# Patient Record
Sex: FEMALE | Race: WHITE | NOT HISPANIC OR LATINO | Employment: OTHER | URBAN - METROPOLITAN AREA
[De-identification: names, ages, dates, MRNs, and addresses within clinical notes are randomized per-mention and may not be internally consistent; named-entity substitution may affect disease eponyms.]

---

## 2017-10-12 ENCOUNTER — OFFICE VISIT (OUTPATIENT)
Dept: FAMILY MEDICINE | Facility: CLINIC | Age: 82
End: 2017-10-12
Payer: MEDICARE

## 2017-10-12 ENCOUNTER — TELEPHONE (OUTPATIENT)
Dept: FAMILY MEDICINE | Facility: CLINIC | Age: 82
End: 2017-10-12

## 2017-10-12 VITALS
HEART RATE: 119 BPM | OXYGEN SATURATION: 97 % | SYSTOLIC BLOOD PRESSURE: 125 MMHG | DIASTOLIC BLOOD PRESSURE: 76 MMHG | WEIGHT: 121.94 LBS | HEIGHT: 63 IN | RESPIRATION RATE: 18 BRPM | BODY MASS INDEX: 21.61 KG/M2 | TEMPERATURE: 99 F

## 2017-10-12 DIAGNOSIS — J11.1 INFLUENZA-LIKE ILLNESS: ICD-10-CM

## 2017-10-12 DIAGNOSIS — I48.91 ATRIAL FIBRILLATION WITH RAPID VENTRICULAR RESPONSE: ICD-10-CM

## 2017-10-12 DIAGNOSIS — Z20.828 EXPOSURE TO THE FLU: Primary | ICD-10-CM

## 2017-10-12 DIAGNOSIS — J10.1 INFLUENZA A: ICD-10-CM

## 2017-10-12 PROCEDURE — 93010 ELECTROCARDIOGRAM REPORT: CPT | Mod: ,,, | Performed by: INTERNAL MEDICINE

## 2017-10-12 PROCEDURE — 99203 OFFICE O/P NEW LOW 30 MIN: CPT | Mod: S$PBB,,, | Performed by: FAMILY MEDICINE

## 2017-10-12 PROCEDURE — 93005 ELECTROCARDIOGRAM TRACING: CPT | Mod: PBBFAC,PO | Performed by: FAMILY MEDICINE

## 2017-10-12 PROCEDURE — 99999 PR PBB SHADOW E&M-NEW PATIENT-LVL III: CPT | Mod: PBBFAC,,, | Performed by: FAMILY MEDICINE

## 2017-10-12 PROCEDURE — 99203 OFFICE O/P NEW LOW 30 MIN: CPT | Mod: PBBFAC,PO | Performed by: FAMILY MEDICINE

## 2017-10-12 RX ORDER — FELODIPINE 5 MG/1
5 TABLET, EXTENDED RELEASE ORAL 2 TIMES DAILY
COMMUNITY
End: 2021-02-03 | Stop reason: SDUPTHER

## 2017-10-12 RX ORDER — LOSARTAN POTASSIUM 100 MG/1
100 TABLET ORAL DAILY
COMMUNITY
End: 2020-11-12

## 2017-10-12 RX ORDER — ASPIRIN 325 MG
325 TABLET ORAL DAILY
COMMUNITY
End: 2019-01-31

## 2017-10-12 RX ORDER — AMOXICILLIN 500 MG
2 CAPSULE ORAL DAILY
COMMUNITY
End: 2019-01-31

## 2017-10-12 NOTE — PROGRESS NOTES
"Subjective:       Patient ID: Rosalva Toney is a 89 y.o. female.    Chief Complaint: URI (head congestion and fatigue)    Pt's daughter tested positive for Flu A today.  They have been together for 4 days.  Pt had a full cardio workup in [place] in past 4-6 months with a cath and all was "normal."      URI    This is a new problem. The current episode started yesterday. There has been no fever. Associated symptoms include coughing and a sore throat. Pertinent negatives include no abdominal pain, chest pain, nausea or rash. Associated symptoms comments: Fatigue.. She has tried NSAIDs for the symptoms. The treatment provided mild relief.     Review of Systems   Constitutional: Negative for fever.   HENT: Positive for sore throat.    Respiratory: Positive for cough. Negative for shortness of breath.    Cardiovascular: Negative for chest pain and palpitations.   Gastrointestinal: Negative for abdominal pain and nausea.   Skin: Negative for rash.   All other systems reviewed and are negative.      Objective:      Physical Exam   Constitutional: She appears well-developed. No distress.   HENT:   Right Ear: Tympanic membrane is not erythematous.   Left Ear: Tympanic membrane is not erythematous.   Nose: Mucosal edema present. Right sinus exhibits no maxillary sinus tenderness. Left sinus exhibits no maxillary sinus tenderness.   Mouth/Throat: Posterior oropharyngeal erythema present.   Neck: Neck supple.   Cardiovascular: An irregularly irregular rhythm present. Tachycardia present.  Exam reveals no gallop.    No murmur heard.  Pulmonary/Chest: Effort normal and breath sounds normal.   Lymphadenopathy:     She has no cervical adenopathy.       Assessment:       1. Exposure to the flu        Plan:         Rosalva was seen today for uri.    Diagnoses and all orders for this visit:    Exposure to the flu  -     POCT Influenza A/B    Atrial fibrillation with rapid ventricular response    Influenza-like illness     To [facility] ER " now for eval and Rx of A Fib

## 2017-10-12 NOTE — TELEPHONE ENCOUNTER
----- Message from Sam Brooks sent at 10/12/2017  2:19 PM CDT -----  Contact: Patient's daughter, Joseline Rodas  Patient's daughter is calling to see if Dr. Rosales will see patient for possible flu.(patient is not an established patient with [facility] at this time)  Call Back #[phone]  Thanks

## 2017-10-13 RX ORDER — OSELTAMIVIR PHOSPHATE 75 MG/1
75 CAPSULE ORAL 2 TIMES DAILY
Qty: 10 CAPSULE | Refills: 0 | Status: SHIPPED | OUTPATIENT
Start: 2017-10-13 | End: 2017-10-18

## 2017-10-16 ENCOUNTER — TELEPHONE (OUTPATIENT)
Dept: FAMILY MEDICINE | Facility: CLINIC | Age: 82
End: 2017-10-16

## 2017-10-16 DIAGNOSIS — Z76.89 ENCOUNTER TO ESTABLISH CARE: Primary | ICD-10-CM

## 2018-03-23 ENCOUNTER — OFFICE VISIT (OUTPATIENT)
Dept: OPTOMETRY | Facility: CLINIC | Age: 83
End: 2018-03-23
Payer: MEDICARE

## 2018-03-23 DIAGNOSIS — Z96.1 PSEUDOPHAKIA: ICD-10-CM

## 2018-03-23 DIAGNOSIS — H35.30 ARMD (AGE RELATED MACULAR DEGENERATION): Primary | ICD-10-CM

## 2018-03-23 DIAGNOSIS — H52.7 REFRACTIVE ERROR: ICD-10-CM

## 2018-03-23 DIAGNOSIS — M35.01 KCS (KERATOCONJUNCTIVITIS SICCA): ICD-10-CM

## 2018-03-23 PROCEDURE — 99999 PR PBB SHADOW E&M-EST. PATIENT-LVL II: CPT | Mod: PBBFAC,,, | Performed by: OPTOMETRIST

## 2018-03-23 PROCEDURE — 99212 OFFICE O/P EST SF 10 MIN: CPT | Mod: PBBFAC,PO | Performed by: OPTOMETRIST

## 2018-03-23 PROCEDURE — 92004 COMPRE OPH EXAM NEW PT 1/>: CPT | Mod: S$PBB,,, | Performed by: OPTOMETRIST

## 2018-03-23 RX ORDER — HYDRALAZINE HYDROCHLORIDE 50 MG/1
TABLET, FILM COATED ORAL
COMMUNITY
Start: 2017-12-29 | End: 2019-01-03

## 2018-03-23 RX ORDER — AMLODIPINE BESYLATE 2.5 MG/1
TABLET ORAL
COMMUNITY
Start: 2017-12-21 | End: 2019-01-03

## 2018-03-23 RX ORDER — APIXABAN 2.5 MG/1
TABLET, FILM COATED ORAL
COMMUNITY
Start: 2018-03-20 | End: 2021-01-14 | Stop reason: SDUPTHER

## 2018-03-23 RX ORDER — METOPROLOL TARTRATE 25 MG/1
TABLET, FILM COATED ORAL
COMMUNITY
Start: 2018-03-19 | End: 2019-01-03 | Stop reason: SDUPTHER

## 2018-03-23 NOTE — PROGRESS NOTES
HPI     Presenting Complaint: Pt here to[place] for yearly eye exam. Pt uses glasses   for reading small print.     (+) Dry eyes. Pt staets over the last few months dryness has got worse.     Ophthalmic medication / drops: Art tears as needed for dryness    (+) Hx of ARMD dry    (+) Cataract sx 2014    (+) Viteyes, classic formula  (+) viteyes Areds 2    (-) headaches  (-) diplopia   (-) flashes / (+) hx of floaters      Last edited by Ulisses Almaraz, OD on 3/23/2018  2:43 PM. (History)            Assessment /Plan     For exam results, see Encounter Report.    ARMD (age related macular degeneration)    KCS (keratoconjunctivitis sicca)    Pseudophakia    Refractive error      ARMD OU. Good vision. Continue OTC AREDS vitamins. Pt denies smoking. Amsler grid for home self-monitoring. Sunglasses outdoors. Monitor yearly, sooner if changes.    KCS OU. Discussed treatment options, pt defers Restasis at this time. Recommend preservative free artificial tears four times daily OU. Discussed punctal plug options, pt will schedule as desired. Return if any worsening of symptoms.    S/p cataract extraction with good results. Pt happy with uncorrected distance vision and OTC readers prn for near. Denies refraction. Return prn for updated spec Rx.      RTC in 1 year for comprehensive eye exam, or sooner prn.

## 2019-01-03 ENCOUNTER — OFFICE VISIT (OUTPATIENT)
Dept: ORTHOPEDICS | Facility: CLINIC | Age: 84
End: 2019-01-03
Payer: MEDICARE

## 2019-01-03 VITALS
BODY MASS INDEX: 20.38 KG/M2 | WEIGHT: 115 LBS | SYSTOLIC BLOOD PRESSURE: 138 MMHG | HEART RATE: 59 BPM | DIASTOLIC BLOOD PRESSURE: 70 MMHG | HEIGHT: 63 IN

## 2019-01-03 DIAGNOSIS — S82.851E TYPE I OR II OPEN TRIMALLEOLAR FRACTURE OF RIGHT ANKLE WITH ROUTINE HEALING, SUBSEQUENT ENCOUNTER: Primary | ICD-10-CM

## 2019-01-03 PROCEDURE — 99024 POSTOP FOLLOW-UP VISIT: CPT | Mod: POP,,, | Performed by: ORTHOPAEDIC SURGERY

## 2019-01-03 PROCEDURE — 99024 PR POST-OP FOLLOW-UP VISIT: ICD-10-PCS | Mod: POP,,, | Performed by: ORTHOPAEDIC SURGERY

## 2019-01-03 PROCEDURE — 73610 PR  X-RAY ANKLE 3+ VW: ICD-10-PCS | Mod: RT,,, | Performed by: ORTHOPAEDIC SURGERY

## 2019-01-03 PROCEDURE — 73610 X-RAY EXAM OF ANKLE: CPT | Mod: RT,,, | Performed by: ORTHOPAEDIC SURGERY

## 2019-01-03 RX ORDER — DIGOXIN 125 UG/1
TABLET ORAL
Refills: 0 | COMMUNITY
Start: 2018-12-26 | End: 2020-12-10

## 2019-01-03 RX ORDER — OXYCODONE HYDROCHLORIDE 5 MG/1
TABLET ORAL
Refills: 0 | COMMUNITY
Start: 2018-12-26 | End: 2019-01-31

## 2019-01-03 RX ORDER — METOPROLOL SUCCINATE 25 MG/1
TABLET, EXTENDED RELEASE ORAL
Refills: 0 | COMMUNITY
Start: 2018-10-30 | End: 2020-09-19

## 2019-01-10 ENCOUNTER — OFFICE VISIT (OUTPATIENT)
Dept: ORTHOPEDICS | Facility: CLINIC | Age: 84
End: 2019-01-10
Payer: MEDICARE

## 2019-01-10 VITALS
BODY MASS INDEX: 21.26 KG/M2 | SYSTOLIC BLOOD PRESSURE: 112 MMHG | DIASTOLIC BLOOD PRESSURE: 68 MMHG | WEIGHT: 120 LBS | HEIGHT: 63 IN | HEART RATE: 113 BPM

## 2019-01-10 DIAGNOSIS — S82.851E TYPE I OR II OPEN TRIMALLEOLAR FRACTURE OF RIGHT ANKLE WITH ROUTINE HEALING, SUBSEQUENT ENCOUNTER: Primary | ICD-10-CM

## 2019-01-10 PROCEDURE — 99024 PR POST-OP FOLLOW-UP VISIT: ICD-10-PCS | Mod: POP,,, | Performed by: ORTHOPAEDIC SURGERY

## 2019-01-10 PROCEDURE — 99024 POSTOP FOLLOW-UP VISIT: CPT | Mod: POP,,, | Performed by: ORTHOPAEDIC SURGERY

## 2019-01-10 NOTE — PROGRESS NOTES
[facility] POST-OP NOTE    Subjective:           Chief Complaint:   Chief Complaint   Patient presents with    Right Ankle - Post-op Evaluation       SUTURES 1 week follow up Right Ankle ORIF (12.19.18) She has minor soreness. It makes her left leg hurt       Past Medical History:   Diagnosis Date    Hypertension     MVP (mitral valve prolapse)     Skin cancer        Past Surgical History:   Procedure Laterality Date    ANKLE FRACTURE SURGERY      HYSTERECTOMY      partial mastecomy      twisted bowl         Current Outpatient Medications   Medication Sig    ASCORBATE CALCIUM (VITAMIN C ORAL) Take by mouth.    aspirin 325 MG tablet Take 325 mg by mouth once daily.    CALCIUM ORAL Take by mouth.    CYANOCOBALAMIN, VITAMIN B-12, (VITAMIN B-12 ORAL) Take by mouth.    DIGOX 125 mcg tablet TK 1 T PO QD    ELIQUIS 2.5 mg Tab     felodipine (PLENDIL) 5 MG 24 hr tablet Take 5 mg by mouth 2 (two) times daily.    fish oil-omega-3 fatty acids 300-1,000 mg capsule Take 2 g by mouth once daily.    LECITHIN ORAL Take by mouth.    losartan (COZAAR) 100 MG tablet Take 100 mg by mouth once daily.    metoprolol succinate (TOPROL-XL) 25 MG 24 hr tablet TK 1 T PO QD    oxyCODONE (ROXICODONE) 5 MG immediate release tablet TK 1 T PO Q 6 H PRN P    UBIDECARENONE (CO Q-10 ORAL) Take by mouth.    VIT C/VIT E/LUTEIN/MIN/OMEGA-3 (OCUVITE ORAL) Take by mouth.    WHEAT GERM OIL ORAL Take by mouth.     No current facility-administered medications for this visit.        Review of patient's allergies indicates:  No Known Allergies    Family History   Problem Relation Age of Onset    Glaucoma Neg Hx     Macular degeneration Neg Hx     Retinal detachment Neg Hx        Social History     Socioeconomic History    Marital status:      Spouse name: Not on file    Number of children: Not on file    Years of education: Not on file    Highest education level: Not on file   Social Needs    Financial resource  strain: Not on file    Food insecurity - worry: Not on file    Food insecurity - inability: Not on file    Transportation needs - medical: Not on file    Transportation needs - non-medical: Not on file   Occupational History    Not on file   Tobacco Use    Smoking status: Never Smoker    Smokeless tobacco: Never Used   Substance and Sexual Activity    Alcohol use: Yes     Comment: 1 [place] daily    Drug use: No    Sexual activity: Not on file   Other Topics Concern    Not on file   Social History Narrative    Not on file       History of present illness: Patient returns status post ORIF of bimalleolar ankle fracture open      Review of Systems:    Musculoskeletal:  See HPI      Objective:        Physical Examination:    Vital Signs:    Vitals:    01/10/19 1246   BP: 112/68   Pulse: (!) 113       Body mass index is 21.26 kg/m².    This a well-developed, well nourished patient in no acute distress.  They are alert and oriented and cooperative to examination.        Wounds are clean dry and intact.  Pertinent New Results:    XRAY Report / Interpretation:   AP lateral oblique of the right ankle demonstrate her fracture be in good position. Mortise is intact. There is about 5 mm of displacement of the medial malleolus    Assessment/Plan:      Severe open fracture right ankle. Continue walker boot. No weightbearing. Follow-up in 3 weeks    This note was created using Dragon voice recognition software that occasionally misinterpreted phrases or words.

## 2019-01-24 DIAGNOSIS — S82.851E: Primary | ICD-10-CM

## 2019-01-24 RX ORDER — HYDROCODONE BITARTRATE AND ACETAMINOPHEN 7.5; 325 MG/1; MG/1
1 TABLET ORAL EVERY 6 HOURS PRN
Qty: 28 TABLET | Refills: 0 | Status: SHIPPED | OUTPATIENT
Start: 2019-01-24 | End: 2019-01-31

## 2019-01-31 ENCOUNTER — OFFICE VISIT (OUTPATIENT)
Dept: ORTHOPEDICS | Facility: CLINIC | Age: 84
End: 2019-01-31
Payer: MEDICARE

## 2019-01-31 VITALS
HEIGHT: 63 IN | HEART RATE: 70 BPM | SYSTOLIC BLOOD PRESSURE: 100 MMHG | BODY MASS INDEX: 21.26 KG/M2 | DIASTOLIC BLOOD PRESSURE: 60 MMHG | WEIGHT: 120 LBS

## 2019-01-31 DIAGNOSIS — S82.851E TYPE I OR II OPEN TRIMALLEOLAR FRACTURE OF RIGHT ANKLE WITH ROUTINE HEALING, SUBSEQUENT ENCOUNTER: Primary | ICD-10-CM

## 2019-01-31 PROCEDURE — 99024 POSTOP FOLLOW-UP VISIT: CPT | Mod: POP,,, | Performed by: ORTHOPAEDIC SURGERY

## 2019-01-31 PROCEDURE — 73610 PR  X-RAY ANKLE 3+ VW: ICD-10-PCS | Mod: RT,,, | Performed by: ORTHOPAEDIC SURGERY

## 2019-01-31 PROCEDURE — 73610 X-RAY EXAM OF ANKLE: CPT | Mod: RT,,, | Performed by: ORTHOPAEDIC SURGERY

## 2019-01-31 PROCEDURE — 99024 PR POST-OP FOLLOW-UP VISIT: ICD-10-PCS | Mod: POP,,, | Performed by: ORTHOPAEDIC SURGERY

## 2019-01-31 NOTE — PROGRESS NOTES
[facility]    Subjective:     Chief Complaint:   Chief Complaint   Patient presents with    Right Ankle - Injury     3 wk follow up from 01/10/19 for Xray ORIF rt ankle fx. No pain. No problems       Past Medical History:   Diagnosis Date    Hypertension     MVP (mitral valve prolapse)     Skin cancer        Past Surgical History:   Procedure Laterality Date    ANKLE FRACTURE SURGERY      HYSTERECTOMY      partial mastecomy      twisted bowl         Current Outpatient Medications   Medication Sig    ASCORBATE CALCIUM (VITAMIN C ORAL) Take by mouth.    CALCIUM ORAL Take by mouth.    DIGOX 125 mcg tablet TK 1 T PO QD    ELIQUIS 2.5 mg Tab     felodipine (PLENDIL) 5 MG 24 hr tablet Take 5 mg by mouth 2 (two) times daily.    LECITHIN ORAL Take by mouth.    losartan (COZAAR) 100 MG tablet Take 100 mg by mouth once daily.    metoprolol succinate (TOPROL-XL) 25 MG 24 hr tablet TK 1 T PO QD    UBIDECARENONE (CO Q-10 ORAL) Take by mouth.    VIT C/VIT E/LUTEIN/MIN/OMEGA-3 (OCUVITE ORAL) Take by mouth.    WHEAT GERM OIL ORAL Take by mouth.    HYDROcodone-acetaminophen (NORCO) 7.5-325 mg per tablet Take 1 tablet by mouth every 6 (six) hours as needed for Pain.     No current facility-administered medications for this visit.        Review of patient's allergies indicates:  No Known Allergies    Family History   Problem Relation Age of Onset    Glaucoma Neg Hx     Macular degeneration Neg Hx     Retinal detachment Neg Hx        Social History     Socioeconomic History    Marital status:      Spouse name: Not on file    Number of children: Not on file    Years of education: Not on file    Highest education level: Not on file   Social Needs    Financial resource strain: Not on file    Food insecurity - worry: Not on file    Food insecurity - inability: Not on file    Transportation needs - medical: Not on file    Transportation needs - non-medical: Not on file   Occupational  History    Not on file   Tobacco Use    Smoking status: Never Smoker    Smokeless tobacco: Never Used   Substance and Sexual Activity    Alcohol use: Yes     Comment: 1 [place] daily    Drug use: No    Sexual activity: Not on file   Other Topics Concern    Not on file   Social History Narrative    Not on file       History of present illness: ***      Review of Systems:    Constitution: Negative for chills, fever, and sweats.  Negative for unexplained weight loss.    HENT:  Negative for headaches and blurry vision.    Cardiovascular:Negative for chest pain or irregular heart beat. Negative for hypertension.    Respiratory:  Negative for cough and shortness of breath.    Gastrointestinal: Negative for abdominal pain, heartburn, melena, nausea, and vomitting.    Genitourinary:  Negative bladder incontinence and dysuria.    Musculoskeletal:  See HPI for details.     Neurological: Negative for numbness.    Psychiatric/Behavioral: Negative for depression.  The patient is not nervous/anxious.      Endocrine: Negative for polyuria    Hematologic/Lymphatic: Negative for bleeding problem.  Does not bruise/bleed easily.    Skin: Negative for poor would healing and rash    Objective:      Physical Examination:    Vital Signs:    Vitals:    01/31/19 1247   BP: 100/60   Pulse: 70       Body mass index is 21.26 kg/m².    This a well-developed, well nourished patient in no acute distress.  They are alert and oriented and cooperative to examination.        ***  Pertinent New Results:    XRAY Report / Interpretation:   ***    Assessment/Plan:      ***      This note was created using Dragon voice recognition software that occasionally misinterpreted phrases or words.

## 2019-01-31 NOTE — PROGRESS NOTES
[facility] ORTHOPEDICS POST-OP NOTE    Subjective:           Chief Complaint:   Chief Complaint   Patient presents with    Right Ankle - Injury     3 wk follow up from 01/10/19 for Xray ORIF rt ankle fx. No pain. No problems       Past Medical History:   Diagnosis Date    Hypertension     MVP (mitral valve prolapse)     Skin cancer        Past Surgical History:   Procedure Laterality Date    ANKLE FRACTURE SURGERY      HYSTERECTOMY      partial mastecomy      twisted bowl         Current Outpatient Medications   Medication Sig    ASCORBATE CALCIUM (VITAMIN C ORAL) Take by mouth.    CALCIUM ORAL Take by mouth.    DIGOX 125 mcg tablet TK 1 T PO QD    ELIQUIS 2.5 mg Tab     felodipine (PLENDIL) 5 MG 24 hr tablet Take 5 mg by mouth 2 (two) times daily.    LECITHIN ORAL Take by mouth.    losartan (COZAAR) 100 MG tablet Take 100 mg by mouth once daily.    metoprolol succinate (TOPROL-XL) 25 MG 24 hr tablet TK 1 T PO QD    UBIDECARENONE (CO Q-10 ORAL) Take by mouth.    VIT C/VIT E/LUTEIN/MIN/OMEGA-3 (OCUVITE ORAL) Take by mouth.    WHEAT GERM OIL ORAL Take by mouth.    HYDROcodone-acetaminophen (NORCO) 7.5-325 mg per tablet Take 1 tablet by mouth every 6 (six) hours as needed for Pain.     No current facility-administered medications for this visit.        Review of patient's allergies indicates:  No Known Allergies    Family History   Problem Relation Age of Onset    Glaucoma Neg Hx     Macular degeneration Neg Hx     Retinal detachment Neg Hx        Social History     Socioeconomic History    Marital status:      Spouse name: Not on file    Number of children: Not on file    Years of education: Not on file    Highest education level: Not on file   Social Needs    Financial resource strain: Not on file    Food insecurity - worry: Not on file    Food insecurity - inability: Not on file    Transportation needs - medical: Not on file    Transportation needs - non-medical: Not on file    Occupational History    Not on file   Tobacco Use    Smoking status: Never Smoker    Smokeless tobacco: Never Used   Substance and Sexual Activity    Alcohol use: Yes     Comment: 1 [place] daily    Drug use: No    Sexual activity: Not on file   Other Topics Concern    Not on file   Social History Narrative    Not on file       History of present illness: Patient comes in today for her right open fracture. She is doing remarkably well. She is 6 weeks out      Review of Systems:    Musculoskeletal:  See HPI      Objective:        Physical Examination:    Vital Signs:    Vitals:    01/31/19 1247   BP: 100/60   Pulse: 70       Body mass index is 21.26 kg/m².    This a well-developed, well nourished patient in no acute distress.  They are alert and oriented and cooperative to examination.        Wounds are clean dry and intact. Neurovascularly intact the foot  Pertinent New Results:    XRAY Report / Interpretation:   AP lateral and oblique of the right ankle demonstrates an intact mortise. The fracture appears to be of unionized. The hardware is in good position. No change in position of the hardware with a fracture    Assessment/Plan:      Stable following ORIF of an open ankle fracture. Continue therapy. Weightbearing as tolerated in the boot. Wean out of the boot in 3 weeks. Follow-up in 6 weeks    This note was created using Dragon voice recognition software that occasionally misinterpreted phrases or words.

## 2019-02-28 ENCOUNTER — OFFICE VISIT (OUTPATIENT)
Dept: FAMILY MEDICINE | Facility: CLINIC | Age: 84
End: 2019-02-28
Payer: MEDICARE

## 2019-02-28 ENCOUNTER — HOSPITAL ENCOUNTER (OUTPATIENT)
Dept: RADIOLOGY | Facility: CLINIC | Age: 84
Discharge: HOME | End: 2019-02-28
Attending: NURSE PRACTITIONER
Payer: MEDICARE

## 2019-02-28 VITALS
DIASTOLIC BLOOD PRESSURE: 71 MMHG | HEIGHT: 63 IN | OXYGEN SATURATION: 98 % | SYSTOLIC BLOOD PRESSURE: 148 MMHG | BODY MASS INDEX: 21.26 KG/M2 | TEMPERATURE: 98 F | HEART RATE: 66 BPM

## 2019-02-28 DIAGNOSIS — I34.1 MVP (MITRAL VALVE PROLAPSE): ICD-10-CM

## 2019-02-28 DIAGNOSIS — M54.50 LOW BACK PAIN, NON-SPECIFIC: ICD-10-CM
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
## 2019-02-28 DIAGNOSIS — I10 ESSENTIAL HYPERTENSION: ICD-10-CM

## 2019-02-28 DIAGNOSIS — Z98.890 S/P ORIF (OPEN REDUCTION INTERNAL FIXATION) FRACTURE: ICD-10-CM

## 2019-02-28 DIAGNOSIS — Z87.81 S/P ORIF (OPEN REDUCTION INTERNAL FIXATION) FRACTURE: ICD-10-CM

## 2019-02-28 PROCEDURE — 99214 OFFICE O/P EST MOD 30 MIN: CPT | Mod: S$PBB,,, | Performed by: NURSE PRACTITIONER

## 2019-02-28 PROCEDURE — 99999 PR PBB SHADOW E&M-EST. PATIENT-LVL IV: CPT | Mod: PBBFAC,,, | Performed by: NURSE PRACTITIONER

## 2019-02-28 PROCEDURE — 72110 XR LUMBAR SPINE COMPLETE 5 VIEW: ICD-10-PCS | Mod: 26,,, | Performed by: RADIOLOGY

## 2019-02-28 PROCEDURE — 99214 OFFICE O/P EST MOD 30 MIN: CPT | Mod: PBBFAC,25,PO | Performed by: NURSE PRACTITIONER

## 2019-02-28 PROCEDURE — 72110 X-RAY EXAM L-2 SPINE 4/>VWS: CPT | Mod: 26,,, | Performed by: RADIOLOGY

## 2019-02-28 PROCEDURE — 72110 X-RAY EXAM L-2 SPINE 4/>VWS: CPT | Mod: TC,FY,PO

## 2019-02-28 PROCEDURE — 99214 PR OFFICE/OUTPT VISIT, EST, LEVL IV, 30-39 MIN: ICD-10-PCS | Mod: S$PBB,,, | Performed by: NURSE PRACTITIONER

## 2019-02-28 PROCEDURE — 99999 PR PBB SHADOW E&M-EST. PATIENT-LVL IV: ICD-10-PCS | Mod: PBBFAC,,, | Performed by: NURSE PRACTITIONER

## 2019-02-28 NOTE — PATIENT INSTRUCTIONS
General Neck and Back Pain    Both neck and back pain are usually caused by injury to the muscles or ligaments of the spine. Sometimes the disks that separate each bone of the spine may cause pain by pressing on a nearby nerve. Back and neck pain may appear after a sudden twisting or bending force (such as in a car accident), or sometimes after a simple awkward movement. In either case, muscle spasm is often present and adds to the pain.  Acute neck and back pain usually gets better in 1 to 2 weeks. Pain related to disk disease, arthritis in the spinal joints or spinal stenosis (narrowing of the spinal canal) can become chronic and last for months or years.  Back and neck pain are common problems. Most people feel better in 1 or 2 weeks, and most of the rest in 1 to 2 months. Most people can remain active.  People experience and describe pain differently.  · Pain can be sharp, stabbing, shooting, aching, cramping, or burning  · Movement, standing, bending, lifting, sitting, or walking may worsen the pain  · Pain can be localized to one spot or area, or it can be more generalized  · Pain can spread or radiate upwards, downwards, to the front, or go down your arms  · Muscle spasm may occur.  Most of the time mechanical problems with the muscles or spine cause the pain. it is usually caused by an injury, whether known or not, to the muscles or ligaments. While illnesses can cause back pain, it is usually not caused by a serious illness. Pain is usually related to physical activity, whether sports, exercise, work, or normal activity. Sometimes it can occur without an identifiable cause. This can happen simply by stretching or moving wrong, without noting pain at the time. Other causes include:  · Overexertion, lifting, pushing, pulling incorrectly or too aggressively.  · Sudden twisting, bending or stretching from an accident (car or fall), or accidental movement.  · Poor posture  · Poor conditioning, lack of regular  exercise  · Spinal disc disease or arthritis  · Stress  · Pregnancy, or illness like appendicitis, bladder or kidney infection, pelvic infections   Home care  · For neck pain: Use a comfortable pillow that supports the head and keeps the spine in a neutral position. The position of the head should not be tilted forward or backward.  · When in bed, try to find a position of comfort. A firm mattress is best. Try lying flat on your back with pillows under your knees. You can also try lying on your side with your knees bent up towards your chest and a pillow between your knees.  · At first, do not try to stretch out the sore spots. If there is a strain, it is not like the good soreness you get after exercising without an injury. In this case, stretching may make it worse.  · Avoid prolonged sitting, long car rides or travel. This puts more stress on the lower back than standing or walking.  · During the first 24 to 72 hours after an injury, apply an ice pack to the painful area for 20 minutes and then remove it for 20 minutes over a period of 60 to 90 minutes or several times a day.   · You can alternate ice and heat therapies. Talk with your healthcare provider about the best treatment for your back or neck pain. As a safety precaution, do not use a heating pad at bedtime. Sleeping with a heating pad can lead to skin burns or tissue damage.  · Therapeutic massage can help relax the back and neck muscles without stretching them.  · Be aware of safe lifting methods and do not lift anything over 15 pounds until all the pain is gone.  Medications  Talk to your healthcare provider before using medicine, especially if you have other medical problems or are taking other medicines.  · You may use over-the-counter medicine to control pain, unless another pain medicine was prescribed. If you have chronic conditions like diabetes, liver or kidney disease, stomach ulcers,  gastrointestinal bleeding, or are taking blood thinner  medicines.  · Be careful if you are given pain medicines, narcotics, or medicine for muscle spasm. They can cause drowsiness, and can affect your coordination, reflexes, and judgment. Do not drive or operate heavy machinery.  Follow-up care  Follow up with your healthcare provider, or as advised. Physical therapy or further tests may be needed.  If X-rays were taken, you will be notified of any new findings that may affect your care.  Call 911  Seek emergency medical care if any of the following occur:  · Trouble breathing  · Confusion  · Very drowsy or trouble awakening  · Fainting or loss of consciousness  · Rapid or very slow heart rate  · Loss of bowel or bladder control  When to seek medical advice  Call your healthcare provider right away if any of these occur:  · Pain becomes worse or spreads into your arms or legs  · Weakness, numbness or pain in one or both arms or legs  · Numbness in the groin area  · Difficulty walking  · Fever of 100.4ºF (38ºC) or higher, or as directed by your healthcare provider  Date Last Reviewed: 7/1/2016 © 2000-2017 [employer]. [address] [place] [address], [place] [address]. All rights reserved. This information is not intended as a substitute for professional medical care. Always follow your healthcare professional's instructions.

## 2019-02-28 NOTE — PROGRESS NOTES
Subjective:       Patient ID: Rosalva Toney is a 89 y.o. female.    Chief Complaint: Fall    Patient presents today with daughter for a fall on 2/27/19. Patient presents in a wheelchair with a right leg immobilizer form a Right Ankle ORIF (12.19.18). Patient is a resident of [facility]. She verbalizes turning while standing without her right leg immobilizer and falling onto the carpet hitting her head and back on the cabinet. She reports no lost of conscious or confusion.. She is followed by Physical Therapy 3 times a week.        Fall   The accident occurred 12 to 24 hours ago. The fall occurred while standing. She fell from a height of 1 to 2 ft. She landed on carpet. There was no blood loss. The point of impact was the head and right elbow. The pain is present in the back. The pain is at a severity of 10/10. The symptoms are aggravated by movement. Pertinent negatives include no fever, loss of consciousness, nausea, numbness or tingling. She has tried nothing for the symptoms.   Back Pain   This is a new problem. The current episode started yesterday. The problem occurs daily. The pain is present in the lumbar spine. The quality of the pain is described as stabbing and aching. The pain does not radiate. The pain is at a severity of 10/10. The symptoms are aggravated by bending. Pertinent negatives include no chest pain, fever, leg pain, numbness or tingling. Risk factors include history of osteoporosis. She has tried analgesics for the symptoms. The treatment provided mild relief.       Past Medical History:   Diagnosis Date    Hypertension     MVP (mitral valve prolapse)     Skin cancer        Review of patient's allergies indicates:  No Known Allergies      Current Outpatient Medications:     ASCORBATE CALCIUM (VITAMIN C ORAL), Take by mouth., Disp: , Rfl:     CALCIUM ORAL, Take by mouth., Disp: , Rfl:     DIGOX 125 mcg tablet, TK 1 T PO QD, Disp: , Rfl: 0    ELIQUIS 2.5 mg Tab, ,  "Disp: , Rfl:     felodipine (PLENDIL) 5 MG 24 hr tablet, Take 5 mg by mouth 2 (two) times daily., Disp: , Rfl:     LECITHIN ORAL, Take by mouth., Disp: , Rfl:     losartan (COZAAR) 100 MG tablet, Take 100 mg by mouth once daily., Disp: , Rfl:     metoprolol succinate (TOPROL-XL) 25 MG 24 hr tablet, TK 1 T PO QD, Disp: , Rfl: 0    UBIDECARENONE (CO Q-10 ORAL), Take by mouth., Disp: , Rfl:     VIT C/VIT E/LUTEIN/MIN/OMEGA-3 (OCUVITE ORAL), Take by mouth., Disp: , Rfl:     WHEAT GERM OIL ORAL, Take by mouth., Disp: , Rfl:     meloxicam (MOBIC) 7.5 MG tablet, Take 1 tablet (7.5 mg total) by mouth once daily. for 7 days, Disp: 7 tablet, Rfl: 0    Review of Systems   Constitutional: Positive for activity change. Negative for fatigue and fever.   HENT: Negative for congestion, rhinorrhea and sore throat.    Respiratory: Negative for shortness of breath.    Cardiovascular: Negative for chest pain and palpitations.   Gastrointestinal: Negative for constipation, diarrhea and nausea.   Genitourinary: Negative for frequency and urgency.   Musculoskeletal: Positive for arthralgias (right ankle) and back pain.   Skin: Positive for wound. Negative for rash.   Neurological: Negative for dizziness, tingling, loss of consciousness, light-headedness and numbness.   Hematological: Negative.    Psychiatric/Behavioral: Negative for agitation and confusion. The patient is not nervous/anxious.        Objective:      BP (!) 148/71 (BP Location: Right arm, Patient Position: Sitting, BP Method: Medium (Automatic))   Pulse 66   Temp 98.3 °F (36.8 °C) (Oral)   Ht 5' 3" (1.6 m)   SpO2 98%   BMI 21.26 kg/m²   Physical Exam   Constitutional: She is oriented to person, place, and time. She appears well-developed and well-nourished.   HENT:   Head: Head is with abrasion.       Eyes: EOM are normal. Pupils are equal, round, and reactive to light.   Neck: Normal range of motion.   Cardiovascular: Normal rate, regular rhythm and normal " heart sounds.   Pulmonary/Chest: Effort normal and breath sounds normal.   Abdominal: Soft. Bowel sounds are normal.   Musculoskeletal:        Right ankle: She exhibits decreased range of motion. Tenderness.   Neurological: She is alert and oriented to person, place, and time.   Skin: Skin is warm and dry.   Psychiatric: She has a normal mood and affect. Her behavior is normal. Judgment and thought content normal.       Assessment:       1. Fall, initial encounter    2. Low back pain, non-specific    3. S/P ORIF (open reduction internal fixation) fracture    4. MVP (mitral valve prolapse)    5. Essential hypertension        Plan:       Fall, initial encounter  -     X-Ray Lumbar Spine Complete 5 View; Future; Expected date: 02/28/2019    Low back pain, non-specific  -     X-Ray Lumbar Spine Complete 5 View; Future; Expected date: 02/28/2019    S/P ORIF (open reduction internal fixation) fracture   Followed by Orthopedics and Physical Therpay  MVP (mitral valve prolapse)   Followed by Cardiology  Essential hypertension   Take prescribed pain medication  BP Readings from Last 3 Encounters:   02/28/19 (!) 148/71   01/31/19 100/60   01/10/19 112/68           Patient readiness: acceptance and barriers:none    During the course of the visit the patient was educated and counseled about the following:     Hypertension:   Dietary sodium restriction.    Goals: Hypertension: Reduce Blood Pressure    Did patient meet goals/outcomes: No    The following self management tools provided: declined    Patient Instructions (the written plan) was given to the patient/family.     Time spent with patient: 30 minutes    Barriers to medications present (no )    Adverse reactions to current medications (no)    Over the counter medications reviewed (Yes)    Time spent with patient: 30 minutes    Patient with be reevaluated in 6 months or sooner prn    Greater than 50% of this visit was spent counseling as described in above  documentation:Yes

## 2019-03-01 ENCOUNTER — TELEPHONE (OUTPATIENT)
Dept: FAMILY MEDICINE | Facility: CLINIC | Age: 84
End: 2019-03-01

## 2019-03-01 DIAGNOSIS — R93.7 ABNORMAL X-RAY OF LUMBAR SPINE: Primary | ICD-10-CM

## 2019-03-01 RX ORDER — MELOXICAM 7.5 MG/1
7.5 TABLET ORAL DAILY
Qty: 7 TABLET | Refills: 0 | Status: SHIPPED | OUTPATIENT
Start: 2019-03-01 | End: 2019-03-06

## 2019-03-01 NOTE — TELEPHONE ENCOUNTER
----- Message from Kd Smith sent at 3/1/2019  9:10 AM CST -----  Contact: Joseline Rodas (Daughter)  Type:  Test Results    Who Called:  Joseline Rodas (Daughter)  Name of Test (Lab/Mammo/Etc):  xray  Date of Test:  2/28/19  Ordering Provider:  Gil  Where the test was performed:  [facility]  Best Call Back Number:  [phone]

## 2019-03-01 NOTE — TELEPHONE ENCOUNTER
Spoke with Joseline regarding her message. Informed her that Mrs. Cruz has not yet reviewed the patients results. Advised as soon as she does and has entered her results we will call her back and let her know what is going on with the x-ray

## 2019-03-02 PROBLEM — I10 ESSENTIAL HYPERTENSION: Status: ACTIVE | Noted: 2019-03-02

## 2019-03-02 PROBLEM — C43.9 SKIN CANCER (MELANOMA): Status: ACTIVE | Noted: 2019-03-02

## 2019-03-02 PROBLEM — S82.891B OPEN FRACTURE OF RIGHT ANKLE: Status: ACTIVE | Noted: 2019-03-02

## 2019-03-02 PROBLEM — I34.1 MVP (MITRAL VALVE PROLAPSE): Status: ACTIVE | Noted: 2019-03-02

## 2019-03-02 PROBLEM — Z98.890 S/P ORIF (OPEN REDUCTION INTERNAL FIXATION) FRACTURE: Status: ACTIVE | Noted: 2019-03-02

## 2019-03-02 PROBLEM — Z87.81 S/P ORIF (OPEN REDUCTION INTERNAL FIXATION) FRACTURE: Status: ACTIVE | Noted: 2019-03-02

## 2019-03-04 ENCOUNTER — TELEPHONE (OUTPATIENT)
Dept: FAMILY MEDICINE | Facility: CLINIC | Age: 84
End: 2019-03-04

## 2019-03-04 NOTE — TELEPHONE ENCOUNTER
----- Message from Violeta Steele sent at 3/4/2019  9:48 AM CST -----  Contact: Daughter Joseline Spilling [phone]   Patient 's daughter Joseline  called her mother was given  Mobic she is on Eliquis and she will not be able to take these two medications together. Daughter is asking for a call back please.

## 2019-03-04 NOTE — TELEPHONE ENCOUNTER
----- Message from Silvia Rai sent at 3/4/2019  3:15 PM CST -----  Contact: Daughter  Type: Needs Medical Advice    Who Called: daughter  Best Call Back Number:[phone]  Additional Information: Need a call back want to know about getting a copy of an Xray of patients back that was done last week. Called Medical Record didn't get any where.

## 2019-03-04 NOTE — TELEPHONE ENCOUNTER
Spoke with daughter. She is not going to take the Mobic. Daughter said patient has been taking hydrocodone form the ankle injury.

## 2019-03-06 ENCOUNTER — OFFICE VISIT (OUTPATIENT)
Dept: ORTHOPEDICS | Facility: CLINIC | Age: 84
End: 2019-03-06
Payer: MEDICARE

## 2019-03-06 VITALS
BODY MASS INDEX: 20.91 KG/M2 | WEIGHT: 118 LBS | HEART RATE: 91 BPM | HEIGHT: 63 IN | DIASTOLIC BLOOD PRESSURE: 78 MMHG | SYSTOLIC BLOOD PRESSURE: 138 MMHG

## 2019-03-06 DIAGNOSIS — S32.010A CLOSED COMPRESSION FRACTURE OF FIRST LUMBAR VERTEBRA, INITIAL ENCOUNTER: Primary | ICD-10-CM

## 2019-03-06 PROCEDURE — 99213 PR OFFICE/OUTPT VISIT, EST, LEVL III, 20-29 MIN: ICD-10-PCS | Mod: 24,,, | Performed by: ORTHOPAEDIC SURGERY

## 2019-03-06 PROCEDURE — 99213 OFFICE O/P EST LOW 20 MIN: CPT | Mod: 24,,, | Performed by: ORTHOPAEDIC SURGERY

## 2019-03-06 RX ORDER — HYDROCODONE BITARTRATE AND ACETAMINOPHEN 7.5; 325 MG/1; MG/1
1 TABLET ORAL EVERY 6 HOURS PRN
COMMUNITY
End: 2019-03-06

## 2019-03-06 RX ORDER — HYDROCODONE BITARTRATE AND ACETAMINOPHEN 5; 325 MG/1; MG/1
1 TABLET ORAL EVERY 6 HOURS PRN
Qty: 28 TABLET | Refills: 0 | Status: SHIPPED | OUTPATIENT
Start: 2019-03-06 | End: 2019-03-12

## 2019-03-06 NOTE — PROGRESS NOTES
Subjective:       Patient ID: Rosalva Toney is a 89 y.o. female.    Chief Complaint: Pain of the Lumbar Spine (Lumbar pain x 1 week since she fell at assisted living  answering the door. She had X ray of lumbar done at [facility] 2-28)      History of Present Illness  89-year-old female fell at assisted living center about a week and a half ago and has had some back pain it is improving with time she does take some occasional hydrocodone for a previous fracture of the right ankle. No bowel or bladder symptoms no leg complaints such as numbness    Current Medications  Current Outpatient Medications   Medication Sig Dispense Refill    DIGOX 125 mcg tablet TK 1 T PO QD  0    ELIQUIS 2.5 mg Tab       felodipine (PLENDIL) 5 MG 24 hr tablet Take 5 mg by mouth 2 (two) times daily.      HYDROcodone-acetaminophen (NORCO) 7.5-325 mg per tablet Take 1 tablet by mouth every 6 (six) hours as needed for Pain.      LECITHIN ORAL Take by mouth.      losartan (COZAAR) 100 MG tablet Take 100 mg by mouth once daily.      metoprolol succinate (TOPROL-XL) 25 MG 24 hr tablet TK 1 T PO QD  0    UBIDECARENONE (CO Q-10 ORAL) Take by mouth.      VIT C/VIT E/LUTEIN/MIN/OMEGA-3 (OCUVITE ORAL) Take by mouth.       No current facility-administered medications for this visit.        Allergies  Review of patient's allergies indicates:  No Known Allergies    Past Medical History  Past Medical History:   Diagnosis Date    Hypertension     MVP (mitral valve prolapse)     Skin cancer        Surgical History  Past Surgical History:   Procedure Laterality Date    ANKLE FRACTURE SURGERY      HYSTERECTOMY      partial mastecomy      twisted bowl         Family History:   Family History   Problem Relation Age of Onset    Glaucoma Neg Hx     Macular degeneration Neg Hx     Retinal detachment Neg Hx        Social History:   Social History     Socioeconomic History    Marital status:      Spouse name: Not on file    Number of  children: Not on file    Years of education: Not on file    Highest education level: Not on file   Social Needs    Financial resource strain: Not on file    Food insecurity - worry: Not on file    Food insecurity - inability: Not on file    Transportation needs - medical: Not on file    Transportation needs - non-medical: Not on file   Occupational History    Not on file   Tobacco Use    Smoking status: Never Smoker    Smokeless tobacco: Never Used   Substance and Sexual Activity    [facility] use: Yes     [facility]    Drug use: No    Sexual activity: Not on file   Other Topics Concern    Not on file   Social History Narrative    Not on file       Hospitalization/Major Diagnostic Procedure:     Review of Systems     General/Constitutional:  Chills denies. Fatigue denies. Fever denies. Weight gain denies. Weight loss denies.    Respiratory:  Shortness of breath denies.    Cardiovascular:  Chest pain denies.    Gastrointestinal:  Constipation denies. Diarrhea denies. Nausea denies. Vomiting denies.     Hematology:  Easy bruising denies. Prolonged bleeding denies.     Genitourinary:  Frequent urination denies. Pain in lower back denies. Painful urination denies.     Musculoskeletal:  See HPI for details    Skin:  Rash denies.    Neurologic:  Dizziness denies. Gait abnormalities denies. Seizures denies. Tingling/Numbess denies.    Psychiatric:  Anxiety denies. Depressed mood denies.     Objective:   Vital Signs:   Vitals:    03/06/19 1426   BP: 138/78   Pulse: 91        Physical Exam      General Examination:     Constitutional: The patient is alert and oriented to lace person and time. Mood is pleasant.     Head/Face: Normal facial features normal eyebrows    Eyes: Normal extraocular motion bilaterally    Lungs: Respirations are equal and unlabored    Gait is coordinated.    Cardiovascular: There are no swelling or varicosities present.    Lymphatic: Negative for adenopathy    Skin:  Normal    Neurological: Level of consciousness normal. Oriented to place person and time and situation    Psychiatric: Oriented to time place person and situation    Patient can stand erect has some pain when doing so minimal tenderness at the thoracolumbar junction no spasm noted range of motion slightly limited straight leg raising negative motor exam intact    XRAY Report/ Interpretation : Prior lumbar and thoracic x-rays performed February 28 were reviewed today is a small anterior compression fracture of L1. No retropulsion noted.      Assessment:       1. Closed compression fracture of first lumbar vertebra, initial encounter        Plan:       Rosalva was seen today for pain.    Diagnoses and all orders for this visit:    Closed compression fracture of first lumbar vertebra, initial encounter         No Follow-up on file.    Treatment options were discussed regards to the nature of the spinal condition conservative pain interventional and surgical options were discussed in detail and the probability of success of the separate treatment options was discussed in detail the patient expressed a clear understanding of the treatment options would regards to surgery the procedure risks benefits complications and outcomes were discussed.  No guarantees were given regards to surgical outcome.  The natural history of compression fractures was discussed she will prefer to avoid vertebral plasty procedure. We will order a lumbosacral orthosis think it would help her symptoms of pain. Prescription given. Return in 4 months with AP lateral x-rays lumbar spine    This note was created using Dragon voice recognition software that occasionally misinterpreted phrases or words.

## 2019-03-12 ENCOUNTER — OFFICE VISIT (OUTPATIENT)
Dept: ORTHOPEDICS | Facility: CLINIC | Age: 84
End: 2019-03-12
Payer: MEDICARE

## 2019-03-12 VITALS
HEIGHT: 63 IN | BODY MASS INDEX: 20.38 KG/M2 | HEART RATE: 68 BPM | WEIGHT: 115 LBS | SYSTOLIC BLOOD PRESSURE: 110 MMHG | DIASTOLIC BLOOD PRESSURE: 70 MMHG

## 2019-03-12 DIAGNOSIS — Z98.890 S/P ORIF (OPEN REDUCTION INTERNAL FIXATION) FRACTURE: Primary | ICD-10-CM

## 2019-03-12 DIAGNOSIS — Z87.81 S/P ORIF (OPEN REDUCTION INTERNAL FIXATION) FRACTURE: Primary | ICD-10-CM

## 2019-03-12 DIAGNOSIS — S82.851E TYPE I OR II OPEN TRIMALLEOLAR FRACTURE OF RIGHT ANKLE WITH ROUTINE HEALING, SUBSEQUENT ENCOUNTER: ICD-10-CM

## 2019-03-12 PROCEDURE — 73610 PR  X-RAY ANKLE 3+ VW: ICD-10-PCS | Mod: RT,,, | Performed by: ORTHOPAEDIC SURGERY

## 2019-03-12 PROCEDURE — 99024 PR POST-OP FOLLOW-UP VISIT: ICD-10-PCS | Mod: POP,,, | Performed by: ORTHOPAEDIC SURGERY

## 2019-03-12 PROCEDURE — 73610 X-RAY EXAM OF ANKLE: CPT | Mod: RT,,, | Performed by: ORTHOPAEDIC SURGERY

## 2019-03-12 PROCEDURE — 99024 POSTOP FOLLOW-UP VISIT: CPT | Mod: POP,,, | Performed by: ORTHOPAEDIC SURGERY

## 2019-03-12 NOTE — PROGRESS NOTES
[facility]    Subjective:     Chief Complaint:   Chief Complaint   Patient presents with    Right Ankle - Injury     ORIF RT ankle 12/19/18. She has no pain but she fell again. She is fearful of falling again.         Past Medical History:   Diagnosis Date    Hypertension     MVP (mitral valve prolapse)     Skin cancer        Past Surgical History:   Procedure Laterality Date    ANKLE FRACTURE SURGERY      HYSTERECTOMY      partial mastecomy      twisted bowl         Current Outpatient Medications   Medication Sig    DIGOX 125 mcg tablet TK 1 T PO QD    ELIQUIS 2.5 mg Tab     felodipine (PLENDIL) 5 MG 24 hr tablet Take 5 mg by mouth 2 (two) times daily.    LECITHIN ORAL Take by mouth.    losartan (COZAAR) 100 MG tablet Take 100 mg by mouth once daily.    metoprolol succinate (TOPROL-XL) 25 MG 24 hr tablet TK 1 T PO QD    UBIDECARENONE (CO Q-10 ORAL) Take by mouth.    VIT C/VIT E/LUTEIN/MIN/OMEGA-3 (OCUVITE ORAL) Take by mouth.     No current facility-administered medications for this visit.        Review of patient's allergies indicates:  No Known Allergies    Family History   Problem Relation Age of Onset    Glaucoma Neg Hx     Macular degeneration Neg Hx     Retinal detachment Neg Hx        Social History     Socioeconomic History    Marital status:      Spouse name: Not on file    Number of children: Not on file    Years of education: Not on file    Highest education level: Not on file   Social Needs    Financial resource strain: Not on file    Food insecurity - worry: Not on file    Food insecurity - inability: Not on file    Transportation needs - medical: Not on file    Transportation needs - non-medical: Not on file   Occupational History    Not on file   Tobacco Use    Smoking status: Never Smoker    Smokeless tobacco: Never Used   Substance and Sexual Activity    Alcohol use: Yes     Comment: 1 [facility] daily    Drug use: No    Sexual activity: Not on  file   Other Topics Concern    Not on file   Social History Narrative    Not on file       History of present illness: Patient returns for the right ankle. She is doing very well. She's not having any problems      Review of Systems:    Constitution: Negative for chills, fever, and sweats.  Negative for unexplained weight loss.    HENT:  Negative for headaches and blurry vision.    Cardiovascular:Negative for chest pain or irregular heart beat. Negative for hypertension.    Respiratory:  Negative for cough and shortness of breath.    Gastrointestinal: Negative for abdominal pain, heartburn, melena, nausea, and vomitting.    Genitourinary:  Negative bladder incontinence and dysuria.    Musculoskeletal:  See HPI for details.     Neurological: Negative for numbness.    Psychiatric/Behavioral: Negative for depression.  The patient is not nervous/anxious.      Endocrine: Negative for polyuria    Hematologic/Lymphatic: Negative for bleeding problem.  Does not bruise/bleed easily.    Skin: Negative for poor would healing and rash    Objective:      Physical Examination:    Vital Signs:    Vitals:    03/12/19 1257   BP: 110/70   Pulse: 68       Body mass index is 20.37 kg/m².    This a well-developed, well nourished patient in no acute distress.  They are alert and oriented and cooperative to examination.        Wounds are well-healed. Clean dry and intact. No cellulitis   Pertinent New Results:    XRAY Report / Interpretation:   AP lateral and oblique of the right ankle demonstrates her hardware to be in good position. The fractures healed well.    Assessment/Plan:      Stable following ORIF of the right ankle. Follow-up when necessary      This note was created using Dragon voice recognition software that occasionally misinterpreted phrases or words.

## 2019-04-01 ENCOUNTER — OFFICE VISIT (OUTPATIENT)
Dept: ORTHOPEDICS | Facility: CLINIC | Age: 84
End: 2019-04-01
Payer: MEDICARE

## 2019-04-01 VITALS
DIASTOLIC BLOOD PRESSURE: 70 MMHG | BODY MASS INDEX: 19.31 KG/M2 | WEIGHT: 109 LBS | SYSTOLIC BLOOD PRESSURE: 138 MMHG | HEIGHT: 63 IN

## 2019-04-01 DIAGNOSIS — M47.816 LUMBAR FACET ARTHROPATHY: ICD-10-CM

## 2019-04-01 DIAGNOSIS — M51.36 DISC DEGENERATION, LUMBAR: ICD-10-CM

## 2019-04-01 DIAGNOSIS — S32.010S CLOSED COMPRESSION FRACTURE OF FIRST LUMBAR VERTEBRA, SEQUELA: Primary | ICD-10-CM

## 2019-04-01 DIAGNOSIS — M48.061 SPINAL STENOSIS OF LUMBAR REGION WITHOUT NEUROGENIC CLAUDICATION: ICD-10-CM

## 2019-04-01 PROCEDURE — 99213 OFFICE O/P EST LOW 20 MIN: CPT | Mod: ,,, | Performed by: ORTHOPAEDIC SURGERY

## 2019-04-01 PROCEDURE — 72100 X-RAY EXAM L-S SPINE 2/3 VWS: CPT | Mod: ,,, | Performed by: ORTHOPAEDIC SURGERY

## 2019-04-01 PROCEDURE — 99213 PR OFFICE/OUTPT VISIT, EST, LEVL III, 20-29 MIN: ICD-10-PCS | Mod: ,,, | Performed by: ORTHOPAEDIC SURGERY

## 2019-04-01 PROCEDURE — 72100 PR  X-RAY LUMBAR SPINE 2/3 VW: ICD-10-PCS | Mod: ,,, | Performed by: ORTHOPAEDIC SURGERY

## 2019-04-01 NOTE — PROGRESS NOTES
Subjective:       Patient ID: Rosalva Toney is a 89 y.o. female.    Chief Complaint: Pain of the Lumbar Spine (4 wk lumbar compression fracture  follow up. She has right side lumbar pain )      History of Present Illness  Patient is here today chief complaint of right lower lumbar pain.  The patient is also here to follow-up for L1 compression fracture which really hasn't been bothering her too much lately.    Current Medications  Current Outpatient Medications   Medication Sig Dispense Refill    DIGOX 125 mcg tablet TK 1 T PO QD  0    ELIQUIS 2.5 mg Tab       felodipine (PLENDIL) 5 MG 24 hr tablet Take 5 mg by mouth 2 (two) times daily.      LECITHIN ORAL Take by mouth.      losartan (COZAAR) 100 MG tablet Take 100 mg by mouth once daily.      metoprolol succinate (TOPROL-XL) 25 MG 24 hr tablet TK 1 T PO QD  0    UBIDECARENONE (CO Q-10 ORAL) Take by mouth.      VIT C/VIT E/LUTEIN/MIN/OMEGA-3 (OCUVITE ORAL) Take by mouth.       No current facility-administered medications for this visit.        Allergies  Review of patient's allergies indicates:  No Known Allergies    Past Medical History  Past Medical History:   Diagnosis Date    Hypertension     MVP (mitral valve prolapse)     Skin cancer        Surgical History  Past Surgical History:   Procedure Laterality Date    ANKLE FRACTURE SURGERY      HYSTERECTOMY      partial mastecomy      twisted bowl         Family History:   Family History   Problem Relation Age of Onset    Glaucoma Neg Hx     Macular degeneration Neg Hx     Retinal detachment Neg Hx        Social History:   Social History     Socioeconomic History    Marital status:      Spouse name: Not on file    Number of children: Not on file    Years of education: Not on file    Highest education level: Not on file   Occupational History    Not on file   Social Needs    Financial resource strain: Not on file    Food insecurity:     Worry: Not on file     Inability: Not on  file    Transportation needs:     Medical: Not on file     Non-medical: Not on file   Tobacco Use    Smoking status: Never Smoker    Smokeless tobacco: Never Used   Substance and Sexual Activity    Alcohol use: Yes     Comment: 1 [place] daily    Drug use: No    Sexual activity: Not on file   Lifestyle    Physical activity:     Days per week: Not on file     Minutes per session: Not on file    Stress: Not on file   Relationships    Social connections:     Talks on phone: Not on file     Gets together: Not on file     Attends Che[employer] service: Not on file     Active member of club or organization: Not on file     Attends meetings of clubs or organizations: Not on file     Relationship status: Not on file    Intimate partner violence:     Fear of current or ex partner: Not on file     Emotionally abused: Not on file     Physically abused: Not on file     Forced sexual activity: Not on file   Other Topics Concern    Not on file   Social History Narrative    Not on file       Hospitalization/Major Diagnostic Procedure:     Review of Systems     General/Constitutional:  Chills denies. Fatigue denies. Fever denies. Weight gain denies. Weight loss denies.    Respiratory:  Shortness of breath denies.    Cardiovascular:  Chest pain denies.    Gastrointestinal:  Constipation denies. Diarrhea denies. Nausea denies. Vomiting denies.     Hematology:  Easy bruising denies. Prolonged bleeding denies.     Genitourinary:  Frequent urination denies. Pain in lower back denies. Painful urination denies.     Musculoskeletal:  See HPI for details    Skin:  Rash denies.    Neurologic:  Dizziness denies. Gait abnormalities denies. Seizures denies. Tingling/Numbess denies.    Psychiatric:  Anxiety denies. Depressed mood denies.     Objective:   Vital Signs:   Vitals:    04/01/19 1331   BP: 138/70        Physical Exam      General Examination:     Constitutional: The patient is alert and oriented to lace person and time. Mood  "is pleasant.     Head/Face: Normal facial features normal eyebrows    Eyes: Normal extraocular motion bilaterally    Lungs: Respirations are equal and unlabored    Gait is coordinated.    Cardiovascular: There are no swelling or varicosities present.    Lymphatic: Negative for adenopathy    Skin: Normal    Neurological: Level of consciousness normal. Oriented to place person and time and situation    Psychiatric: Oriented to time place person and situation    Lumbar exam: Mild antalgic gait with a rolling walker. Is able to stand erect without any significant pain. No significant tenderness to palpation. Bilateral lower extremities are distal neurovascular intact.    XRAY Report/ Interpretation: Lumbar AP lateral x-rays taken in the office today reviewed the patient demonstrate advanced multilevel lumbar degenerative disc disease and facet sclerosis causing a moderate to significant amount of foraminal stenosis. There is mild compression deformity of the L1 vertebral body      Assessment:       1. Closed compression fracture of first lumbar vertebra, sequela    2. Disc degeneration, lumbar    3. Lumbar facet arthropathy    4. Spinal stenosis of lumbar region without neurogenic claudication        Plan:       Rosalva was seen today for pain.    Diagnoses and all orders for this visit:    Closed compression fracture of first lumbar vertebra, sequela  -     X-Ray Lumbar Spine Ap And Lateral    Disc degeneration, lumbar    Lumbar facet arthropathy    Spinal stenosis of lumbar region without neurogenic claudication         No follow-ups on file.  Jamie Andrews, physician's assistant served in the capacity as a "scribe" for this patient encounter  A "face to face" encounter occurred with Dr. Bear on this date  The treatment plan and medical decision making is outlined below:  Continue with activity as tolerated. If the pain returns or becomes unbearable then we would order a lumbar MRI without contrast and Hermilo of " referring her for epidural type injections for her lumbar spine. If the pain is tolerable and/or bearable then follow-up as needed    This note was created using Dragon voice recognition software that occasionally misinterpreted phrases or words.

## 2019-08-06 ENCOUNTER — PATIENT OUTREACH (OUTPATIENT)
Dept: ADMINISTRATIVE | Facility: HOSPITAL | Age: 84
End: 2019-08-06

## 2019-11-20 ENCOUNTER — OFFICE VISIT (OUTPATIENT)
Dept: ORTHOPEDICS | Facility: CLINIC | Age: 84
End: 2019-11-20
Payer: MEDICARE

## 2019-11-20 VITALS — WEIGHT: 110 LBS | DIASTOLIC BLOOD PRESSURE: 70 MMHG | BODY MASS INDEX: 19.49 KG/M2 | SYSTOLIC BLOOD PRESSURE: 102 MMHG

## 2019-11-20 DIAGNOSIS — M50.322 OTHER CERVICAL DISC DEGENERATION AT C5-C6 LEVEL: ICD-10-CM

## 2019-11-20 DIAGNOSIS — M46.04 SPINAL ENTHESOPATHY OF THORACIC REGION: ICD-10-CM

## 2019-11-20 DIAGNOSIS — M54.9 MID BACK PAIN: ICD-10-CM

## 2019-11-20 DIAGNOSIS — M54.12 CERVICAL RADICULITIS: Primary | ICD-10-CM

## 2019-11-20 PROCEDURE — 1125F PR PAIN SEVERITY QUANTIFIED, PAIN PRESENT: ICD-10-PCS | Mod: S$GLB,,, | Performed by: ORTHOPAEDIC SURGERY

## 2019-11-20 PROCEDURE — 99213 PR OFFICE/OUTPT VISIT, EST, LEVL III, 20-29 MIN: ICD-10-PCS | Mod: 25,S$GLB,, | Performed by: ORTHOPAEDIC SURGERY

## 2019-11-20 PROCEDURE — 20552 TRIGGER POINT INJECTION: ICD-10-PCS | Mod: S$GLB,,, | Performed by: ORTHOPAEDIC SURGERY

## 2019-11-20 PROCEDURE — 99213 OFFICE O/P EST LOW 20 MIN: CPT | Mod: 25,S$GLB,, | Performed by: ORTHOPAEDIC SURGERY

## 2019-11-20 PROCEDURE — 1125F AMNT PAIN NOTED PAIN PRSNT: CPT | Mod: S$GLB,,, | Performed by: ORTHOPAEDIC SURGERY

## 2019-11-20 PROCEDURE — 1159F MED LIST DOCD IN RCRD: CPT | Mod: S$GLB,,, | Performed by: ORTHOPAEDIC SURGERY

## 2019-11-20 PROCEDURE — 1159F PR MEDICATION LIST DOCUMENTED IN MEDICAL RECORD: ICD-10-PCS | Mod: S$GLB,,, | Performed by: ORTHOPAEDIC SURGERY

## 2019-11-20 PROCEDURE — 20552 NJX 1/MLT TRIGGER POINT 1/2: CPT | Mod: S$GLB,,, | Performed by: ORTHOPAEDIC SURGERY

## 2019-11-20 RX ORDER — HYDROCODONE BITARTRATE AND ACETAMINOPHEN 7.5; 325 MG/1; MG/1
1 TABLET ORAL EVERY 6 HOURS PRN
Qty: 28 TABLET | Refills: 0 | Status: SHIPPED | OUTPATIENT
Start: 2019-11-20 | End: 2019-11-27

## 2019-11-20 RX ORDER — DICLOFENAC SODIUM 10 MG/G
2 GEL TOPICAL 4 TIMES DAILY
Qty: 500 G | Refills: 3 | Status: SHIPPED | OUTPATIENT
Start: 2019-11-20 | End: 2021-05-25

## 2019-11-20 RX ORDER — METHYLPREDNISOLONE ACETATE 40 MG/ML
40 INJECTION, SUSPENSION INTRA-ARTICULAR; INTRALESIONAL; INTRAMUSCULAR; SOFT TISSUE
Status: DISCONTINUED | OUTPATIENT
Start: 2019-11-20 | End: 2019-11-20 | Stop reason: HOSPADM

## 2019-11-20 RX ADMIN — METHYLPREDNISOLONE ACETATE 40 MG: 40 INJECTION, SUSPENSION INTRA-ARTICULAR; INTRALESIONAL; INTRAMUSCULAR; SOFT TISSUE at 02:11

## 2019-11-20 NOTE — PROCEDURES
Trigger Point injection  Date/Time: 11/20/2019 2:45 PM  Performed by: Teja Bear MD  Authorized by: Teja Bear MD     Consent Done?:  Yes (Verbal)  Timeout: prior to procedure the correct patient, procedure, and site was verified    Indications:  Pain  Site marked: the procedure site was marked    Timeout: prior to procedure the correct patient, procedure, and site was verified    Location: Thoracic trigger point.  Prep: patient was prepped and draped in usual sterile fashion    Medications:  40 mg methylPREDNISolone acetate 40 mg/mL  Patient tolerance:  Patient tolerated the procedure well with no immediate complications

## 2019-11-20 NOTE — PROGRESS NOTES
Subjective:       Patient ID: Rosalva Toney is a 89 y.o. female.    Chief Complaint: Pain of the Thoracic Spine (pain in upper back between sld blades denies neck pain but left arm pain to elbow x 3 weeks)      History of Present Illness  Patient is here with a chief complaint of left parascapular pain that started about 3 weeks ago.  She denies any acute injury.  She also has some left upper extremity radiculitis.    Current Medications  Current Outpatient Medications   Medication Sig Dispense Refill    DIGOX 125 mcg tablet TK 1 T PO QD  0    ELIQUIS 2.5 mg Tab       felodipine (PLENDIL) 5 MG 24 hr tablet Take 5 mg by mouth 2 (two) times daily.      LECITHIN ORAL Take by mouth.      losartan (COZAAR) 100 MG tablet Take 100 mg by mouth once daily.      metoprolol succinate (TOPROL-XL) 25 MG 24 hr tablet TK 1 T PO QD  0    UBIDECARENONE (CO Q-10 ORAL) Take by mouth.      VIT C/VIT E/LUTEIN/MIN/OMEGA-3 (OCUVITE ORAL) Take by mouth.       No current facility-administered medications for this visit.        Allergies  Review of patient's allergies indicates:  No Known Allergies    Past Medical History  Past Medical History:   Diagnosis Date    Hypertension     MVP (mitral valve prolapse)     Skin cancer        Surgical History  Past Surgical History:   Procedure Laterality Date    ANKLE FRACTURE SURGERY      HYSTERECTOMY      partial mastecomy      twisted bowl         Family History:   Family History   Problem Relation Age of Onset    Glaucoma Neg Hx     Macular degeneration Neg Hx     Retinal detachment Neg Hx        Social History:   Social History     Socioeconomic History    Marital status:      Spouse name: Not on file    Number of children: Not on file    Years of education: Not on file    Highest education level: Not on file   Occupational History    Not on file   Social Needs    Financial resource strain: Not on file    Food insecurity:     Worry: Not on file      Inability: Not on file    Transportation needs:     Medical: Not on file     Non-medical: Not on file   Tobacco Use    Smoking status: Never Smoker    Smokeless tobacco: Never Used   Substance and Sexual Activity    Alcohol use: Yes     Comment: 1 Elmer daily    Drug use: No    Sexual activity: Not on file   Lifestyle    Physical activity:     Days per week: Not on file     Minutes per session: Not on file    Stress: Not on file   Relationships    Social connections:     Talks on phone: Not on file     Gets together: Not on file     Attends Lutheran service: Not on file     Active member of club or organization: Not on file     Attends meetings of clubs or organizations: Not on file     Relationship status: Not on file   Other Topics Concern    Not on file   Social History Narrative    Not on file       Hospitalization/Major Diagnostic Procedure:     Review of Systems     General/Constitutional:  Chills denies. Fatigue denies. Fever denies. Weight gain denies. Weight loss denies.    Respiratory:  Shortness of breath denies.    Cardiovascular:  Chest pain denies.    Gastrointestinal:  Constipation denies. Diarrhea denies. Nausea denies. Vomiting denies.     Hematology:  Easy bruising denies. Prolonged bleeding denies.     Genitourinary:  Frequent urination denies. Pain in lower back denies. Painful urination denies.     Musculoskeletal:  See HPI for details    Skin:  Rash denies.    Neurologic:  Dizziness denies. Gait abnormalities denies. Seizures denies. Tingling/Numbess denies.    Psychiatric:  Anxiety denies. Depressed mood denies.     Objective:   Vital Signs:   Vitals:    11/20/19 1532   BP: 102/70        Physical Exam      General Examination:     Constitutional: The patient is alert and oriented to lace person and time. Mood is pleasant.     Head/Face: Normal facial features normal eyebrows    Eyes: Normal extraocular motion bilaterally    Lungs: Respirations are equal and unlabored    Gait is  "coordinated.    Cardiovascular: There are no swelling or varicosities present.    Lymphatic: Negative for adenopathy    Skin: Normal    Neurological: Level of consciousness normal. Oriented to place person and time and situation    Psychiatric: Oriented to time place person and situation    Cervical and thoracic exam demonstrate range of motion limited but within functional limits a shins.  Bilateral upper extremities are distal neurovascular intact with equal symmetric DTRs, normal strength and negative Dc's.  Significant tenderness palpation with palpable muscle spasm of the left rhomboids.    XRAY Report/ Interpretation:  Thoracic x-rays AP and lateral done in the office today reviewed the patient demonstrate no significant abnormalities.    Cervical AP and lateral x-rays taken in the office today reviewed the patient demonstrate significant degenerative disc disease at C5-6 and C6-7      Assessment:       1. Cervical radiculitis    2. Mid back pain    3. Spinal enthesopathy of thoracic region    4. Other cervical disc degeneration at C5-C6 level        Plan:       Rosalva was seen today for pain.    Diagnoses and all orders for this visit:    Cervical radiculitis  -     X-Ray Cervical Spine AP And Lateral    Mid back pain  -     X-Ray Thoracic Spine AP Lateral    Spinal enthesopathy of thoracic region    Other cervical disc degeneration at C5-C6 level         No follow-ups on file.  Jamie Andrews, physician's assistant served in the capacity as a "scribe" for this patient encounter  A "face to face" encounter occurred with Dr. Bear on this date  The treatment plan and medical decision making is outlined below:  Today she was given a left parascapular/rhomboid trigger point injection with 1 cc lidocaine and 40 mg of Depo-Medrol.  I will order an MRI of the cervical spine and have her follow-up in about 3 weeks review that.  I refilled medications for.    This note was created using Dragon voice recognition " software that occasionally misinterpreted phrases or words.

## 2019-12-03 ENCOUNTER — HOSPITAL ENCOUNTER (OUTPATIENT)
Dept: RADIOLOGY | Facility: HOSPITAL | Age: 84
Discharge: HOME | End: 2019-12-03
Attending: ORTHOPAEDIC SURGERY
Payer: MEDICARE

## 2019-12-03 DIAGNOSIS — M54.12 CERVICAL RADICULITIS: ICD-10-CM

## 2019-12-03 DIAGNOSIS — M50.322 OTHER CERVICAL DISC DEGENERATION AT C5-C6 LEVEL: ICD-10-CM

## 2019-12-03 PROCEDURE — 72141 MRI NECK SPINE W/O DYE: CPT | Mod: TC,PO

## 2019-12-11 ENCOUNTER — OFFICE VISIT (OUTPATIENT)
Dept: ORTHOPEDICS | Facility: CLINIC | Age: 84
End: 2019-12-11
Payer: MEDICARE

## 2019-12-11 VITALS
HEIGHT: 63 IN | WEIGHT: 110 LBS | BODY MASS INDEX: 19.49 KG/M2 | SYSTOLIC BLOOD PRESSURE: 160 MMHG | DIASTOLIC BLOOD PRESSURE: 83 MMHG | HEART RATE: 62 BPM

## 2019-12-11 DIAGNOSIS — M50.322 OTHER CERVICAL DISC DEGENERATION AT C5-C6 LEVEL: Primary | ICD-10-CM

## 2019-12-11 PROCEDURE — 1126F PR PAIN SEVERITY QUANTIFIED, NO PAIN PRESENT: ICD-10-PCS | Mod: S$GLB,,, | Performed by: ORTHOPAEDIC SURGERY

## 2019-12-11 PROCEDURE — 99213 OFFICE O/P EST LOW 20 MIN: CPT | Mod: S$GLB,,, | Performed by: ORTHOPAEDIC SURGERY

## 2019-12-11 PROCEDURE — 99213 PR OFFICE/OUTPT VISIT, EST, LEVL III, 20-29 MIN: ICD-10-PCS | Mod: S$GLB,,, | Performed by: ORTHOPAEDIC SURGERY

## 2019-12-11 PROCEDURE — 1159F MED LIST DOCD IN RCRD: CPT | Mod: S$GLB,,, | Performed by: ORTHOPAEDIC SURGERY

## 2019-12-11 PROCEDURE — 1159F PR MEDICATION LIST DOCUMENTED IN MEDICAL RECORD: ICD-10-PCS | Mod: S$GLB,,, | Performed by: ORTHOPAEDIC SURGERY

## 2019-12-11 PROCEDURE — 1126F AMNT PAIN NOTED NONE PRSNT: CPT | Mod: S$GLB,,, | Performed by: ORTHOPAEDIC SURGERY

## 2019-12-11 RX ORDER — TRAMADOL HYDROCHLORIDE 50 MG/1
TABLET ORAL
Refills: 0 | COMMUNITY
Start: 2019-11-26 | End: 2021-05-25

## 2019-12-11 NOTE — PROGRESS NOTES
Subjective:       Patient ID: Rosalva Toney is a 89 y.o. female.    Chief Complaint: Pain of the Neck (C spine pain/MRI results. States that her neck is doing fine. )      History of Present Illness  Neck pain has resolved since last visit after having trigger point injection she is quite pleased.    Current Medications  Current Outpatient Medications   Medication Sig Dispense Refill    DIGOX 125 mcg tablet TK 1 T PO QD  0    ELIQUIS 2.5 mg Tab       felodipine (PLENDIL) 5 MG 24 hr tablet Take 5 mg by mouth 2 (two) times daily.      losartan (COZAAR) 100 MG tablet Take 100 mg by mouth once daily.      metoprolol succinate (TOPROL-XL) 25 MG 24 hr tablet TK 1 T PO QD  0    VIT C/VIT E/LUTEIN/MIN/OMEGA-3 (OCUVITE ORAL) Take by mouth.      diclofenac sodium (VOLTAREN) 1 % Gel Apply 2 g topically 4 (four) times daily. (Patient not taking: Reported on 12/11/2019) 500 g 3    traMADol (ULTRAM) 50 mg tablet TK 1 T PO Q 8 H PRN P  0     No current facility-administered medications for this visit.        Allergies  Review of patient's allergies indicates:  No Known Allergies    Past Medical History  Past Medical History:   Diagnosis Date    Hypertension     MVP (mitral valve prolapse)     Skin cancer        Surgical History  Past Surgical History:   Procedure Laterality Date    ANKLE FRACTURE SURGERY      HYSTERECTOMY      partial mastecomy      twisted bowl         Family History:   Family History   Problem Relation Age of Onset    Glaucoma Neg Hx     Macular degeneration Neg Hx     Retinal detachment Neg Hx        Social History:   Social History     Socioeconomic History    Marital status:      Spouse name: Not on file    Number of children: Not on file    Years of education: Not on file    Highest education level: Not on file   Occupational History    Not on file   Social Needs    Financial resource strain: Not on file    Food insecurity:     Worry: Not on file     Inability: Not on  file    Transportation needs:     Medical: Not on file     Non-medical: Not on file   Tobacco Use    Smoking status: Never Smoker    Smokeless tobacco: Never Used   Substance and Sexual Activity    Alcohol use: Yes     Comment: 1 Elmer daily    Drug use: No    Sexual activity: Not on file   Lifestyle    Physical activity:     Days per week: Not on file     Minutes per session: Not on file    Stress: Not on file   Relationships    Social connections:     Talks on phone: Not on file     Gets together: Not on file     Attends Shinto service: Not on file     Active member of club or organization: Not on file     Attends meetings of clubs or organizations: Not on file     Relationship status: Not on file   Other Topics Concern    Not on file   Social History Narrative    Not on file       Hospitalization/Major Diagnostic Procedure:     Review of Systems     General/Constitutional:  Chills denies. Fatigue denies. Fever denies. Weight gain denies. Weight loss denies.    Respiratory:  Shortness of breath denies.    Cardiovascular:  Chest pain denies.    Gastrointestinal:  Constipation denies. Diarrhea denies. Nausea denies. Vomiting denies.     Hematology:  Easy bruising denies. Prolonged bleeding denies.     Genitourinary:  Frequent urination denies. Pain in lower back denies. Painful urination denies.     Musculoskeletal:  See HPI for details    Skin:  Rash denies.    Neurologic:  Dizziness denies. Gait abnormalities denies. Seizures denies. Tingling/Numbess denies.    Psychiatric:  Anxiety denies. Depressed mood denies.     Objective:   Vital Signs:   Vitals:    12/11/19 1539   BP: (!) 160/83   Pulse: 62        Physical Exam      General Examination:     Constitutional: The patient is alert and oriented to lace person and time. Mood is pleasant.     Head/Face: Normal facial features normal eyebrows    Eyes: Normal extraocular motion bilaterally    Lungs: Respirations are equal and unlabored    Gait is  coordinated.    Cardiovascular: There are no swelling or varicosities present.    Lymphatic: Negative for adenopathy    Skin: Normal    Neurological: Level of consciousness normal. Oriented to place person and time and situation    Psychiatric: Oriented to time place person and situation    Mild tenderness bilateral rhomboid muscles no spasm mild restriction of motion Spurling's maneuver negative  XRAY Report/ Interpretation:  MRI reviewed showing multilevel cervical spondylosis and posterior disc protrusion C6-7      Assessment:       1. Other cervical disc degeneration at C5-C6 level        Plan:       Rosalva was seen today for pain.    Diagnoses and all orders for this visit:    Other cervical disc degeneration at C5-C6 level         Follow up if symptoms worsen or fail to improve.    At this point she is doing well clinically there is no need for any further treatment return as needed advised    This note was created using Dragon voice recognition software that occasionally misinterpreted phrases or words.

## 2020-03-31 ENCOUNTER — TELEPHONE (OUTPATIENT)
Dept: OPTOMETRY | Facility: CLINIC | Age: 85
End: 2020-03-31

## 2020-03-31 NOTE — TELEPHONE ENCOUNTER
L/M on vm for pt that eye appt on 4-17-20 with Dr. Almaraz is cancelled due to COVID-19 and will need to be rescheduled.

## 2020-05-25 ENCOUNTER — OFFICE VISIT (OUTPATIENT)
Dept: OPTOMETRY | Facility: CLINIC | Age: 85
End: 2020-05-25
Payer: MEDICARE

## 2020-05-25 DIAGNOSIS — Z96.1 PSEUDOPHAKIA: ICD-10-CM

## 2020-05-25 DIAGNOSIS — H35.30 ARMD (AGE-RELATED MACULAR DEGENERATION), BILATERAL: Primary | ICD-10-CM

## 2020-05-25 DIAGNOSIS — H52.7 REFRACTIVE ERROR: ICD-10-CM

## 2020-05-25 DIAGNOSIS — H35.363 DRUSEN (DEGENERATIVE) OF MACULA, BILATERAL: ICD-10-CM

## 2020-05-25 PROCEDURE — 99999 PR PBB SHADOW E&M-EST. PATIENT-LVL II: CPT | Mod: PBBFAC,,, | Performed by: OPTOMETRIST

## 2020-05-25 PROCEDURE — 99999 PR PBB SHADOW E&M-EST. PATIENT-LVL II: ICD-10-PCS | Mod: PBBFAC,,, | Performed by: OPTOMETRIST

## 2020-05-25 PROCEDURE — 92134 CPTRZ OPH DX IMG PST SGM RTA: CPT | Mod: PBBFAC,PO | Performed by: OPTOMETRIST

## 2020-05-25 PROCEDURE — 92014 COMPRE OPH EXAM EST PT 1/>: CPT | Mod: S$PBB,,, | Performed by: OPTOMETRIST

## 2020-05-25 PROCEDURE — 99212 OFFICE O/P EST SF 10 MIN: CPT | Mod: PBBFAC,PO,25 | Performed by: OPTOMETRIST

## 2020-05-25 PROCEDURE — 92134 POSTERIOR SEGMENT OCT RETINA (OCULAR COHERENCE TOMOGRAPHY)-BOTH EYES: ICD-10-PCS | Mod: 26,S$PBB,, | Performed by: OPTOMETRIST

## 2020-05-25 PROCEDURE — 92014 PR EYE EXAM, EST PATIENT,COMPREHESV: ICD-10-PCS | Mod: S$PBB,,, | Performed by: OPTOMETRIST

## 2020-05-25 NOTE — PROGRESS NOTES
HPI     Annual Exam      Additional comments: DLE 3-18 ([facility])   ocular health exam               Blurred Vision      Additional comments: at both near & distance -- OS only              Eye Strain      Additional comments: OS feels tired w/ reading          Last edited by Maggie Henson on 5/25/2020  1:46 PM. (History)            Assessment /Plan     For exam results, see Encounter Report.    ARMD (age-related macular degeneration), bilateral  -     Posterior Segment OCT Retina-Both eyes    Drusen (degenerative) of macula, bilateral   -     Posterior Segment OCT Retina-Both eyes    Pseudophakia    Refractive error    Drusen (degenerative) of macula, bilateral  -     Posterior Segment OCT Retina-Both eyes      ARMD OS > OD. Reduced vision OS secondary to exudative mac degeneration. Discussed findings and treatment options, pt declines retinal consult at this time. Discussed possible worsening and/or permanent vision loss, pt reports good understanding.     S/p cataract extraction OU. No improvement with refraction, pt will keep current specs.       RTC in 1 year for comprehensive eye exam, or sooner prn.

## 2020-07-20 ENCOUNTER — LAB VISIT (OUTPATIENT)
Dept: LAB | Facility: OTHER | Age: 85
End: 2020-07-20
Attending: INTERNAL MEDICINE
Payer: MEDICARE

## 2020-07-20 DIAGNOSIS — Z11.59 SPECIAL SCREENING EXAMINATION FOR UNSPECIFIED VIRAL DISEASE: ICD-10-CM

## 2020-07-20 PROCEDURE — U0003 INFECTIOUS AGENT DETECTION BY NUCLEIC ACID (DNA OR RNA); SEVERE ACUTE RESPIRATORY SYNDROME CORONAVIRUS 2 (SARS-COV-2) (CORONAVIRUS DISEASE [COVID-19]), AMPLIFIED PROBE TECHNIQUE, MAKING USE OF HIGH THROUGHPUT TECHNOLOGIES AS DESCRIBED BY CMS-2020-01-R: HCPCS

## 2020-07-23 LAB — SARS-COV-2 RNA RESP QL NAA+PROBE: NEGATIVE

## 2020-07-30 ENCOUNTER — LAB VISIT (OUTPATIENT)
Dept: LAB | Facility: OTHER | Age: 85
End: 2020-07-30
Payer: MEDICARE

## 2020-07-30 DIAGNOSIS — Z03.818 ENCOUNTER FOR OBSERVATION FOR SUSPECTED EXPOSURE TO OTHER BIOLOGICAL AGENTS RULED OUT: ICD-10-CM

## 2020-07-30 DIAGNOSIS — Z20.822 SUSPECTED COVID-19 VIRUS INFECTION: ICD-10-CM

## 2020-07-30 PROCEDURE — U0003 INFECTIOUS AGENT DETECTION BY NUCLEIC ACID (DNA OR RNA); SEVERE ACUTE RESPIRATORY SYNDROME CORONAVIRUS 2 (SARS-COV-2) (CORONAVIRUS DISEASE [COVID-19]), AMPLIFIED PROBE TECHNIQUE, MAKING USE OF HIGH THROUGHPUT TECHNOLOGIES AS DESCRIBED BY CMS-2020-01-R: HCPCS

## 2020-08-03 LAB — SARS-COV-2 RNA RESP QL NAA+PROBE: NORMAL

## 2020-09-10 ENCOUNTER — LAB VISIT (OUTPATIENT)
Dept: LAB | Facility: OTHER | Age: 85
End: 2020-09-10
Attending: INTERNAL MEDICINE
Payer: MEDICARE

## 2020-09-10 DIAGNOSIS — Z03.818 ENCOUNTER FOR OBSERVATION FOR SUSPECTED EXPOSURE TO OTHER BIOLOGICAL AGENTS RULED OUT: ICD-10-CM

## 2020-09-10 PROCEDURE — U0003 INFECTIOUS AGENT DETECTION BY NUCLEIC ACID (DNA OR RNA); SEVERE ACUTE RESPIRATORY SYNDROME CORONAVIRUS 2 (SARS-COV-2) (CORONAVIRUS DISEASE [COVID-19]), AMPLIFIED PROBE TECHNIQUE, MAKING USE OF HIGH THROUGHPUT TECHNOLOGIES AS DESCRIBED BY CMS-2020-01-R: HCPCS

## 2020-09-12 LAB — SARS-COV-2 RNA RESP QL NAA+PROBE: NOT DETECTED

## 2020-09-17 ENCOUNTER — TELEPHONE (OUTPATIENT)
Dept: CARDIOLOGY | Facility: CLINIC | Age: 85
End: 2020-09-17

## 2020-09-17 NOTE — TELEPHONE ENCOUNTER
----- Message from Eli Aldrich LPN sent at 9/17/2020  2:03 PM CDT -----  Regarding: paperwork  Contact: [phone]  Daughter, Joseline Rodas, calls about paperwork [facility] is needing for patient. She states waiting on this for quite awhile. Please return call.

## 2020-09-22 ENCOUNTER — LAB VISIT (OUTPATIENT)
Dept: LAB | Facility: OTHER | Age: 85
End: 2020-09-22
Attending: INTERNAL MEDICINE
Payer: MEDICARE

## 2020-09-22 DIAGNOSIS — Z03.818 ENCOUNTER FOR OBSERVATION FOR SUSPECTED EXPOSURE TO OTHER BIOLOGICAL AGENTS RULED OUT: ICD-10-CM

## 2020-09-22 PROCEDURE — U0003 INFECTIOUS AGENT DETECTION BY NUCLEIC ACID (DNA OR RNA); SEVERE ACUTE RESPIRATORY SYNDROME CORONAVIRUS 2 (SARS-COV-2) (CORONAVIRUS DISEASE [COVID-19]), AMPLIFIED PROBE TECHNIQUE, MAKING USE OF HIGH THROUGHPUT TECHNOLOGIES AS DESCRIBED BY CMS-2020-01-R: HCPCS

## 2020-09-23 LAB — SARS-COV-2 RNA RESP QL NAA+PROBE: NOT DETECTED

## 2020-10-13 ENCOUNTER — LAB VISIT (OUTPATIENT)
Dept: LAB | Facility: OTHER | Age: 85
End: 2020-10-13
Attending: INTERNAL MEDICINE
Payer: MEDICARE

## 2020-10-13 DIAGNOSIS — Z03.818 ENCOUNTER FOR OBSERVATION FOR SUSPECTED EXPOSURE TO OTHER BIOLOGICAL AGENTS RULED OUT: ICD-10-CM

## 2020-10-13 PROCEDURE — U0003 INFECTIOUS AGENT DETECTION BY NUCLEIC ACID (DNA OR RNA); SEVERE ACUTE RESPIRATORY SYNDROME CORONAVIRUS 2 (SARS-COV-2) (CORONAVIRUS DISEASE [COVID-19]), AMPLIFIED PROBE TECHNIQUE, MAKING USE OF HIGH THROUGHPUT TECHNOLOGIES AS DESCRIBED BY CMS-2020-01-R: HCPCS

## 2020-10-14 LAB — SARS-COV-2 RNA RESP QL NAA+PROBE: NOT DETECTED

## 2020-10-20 ENCOUNTER — LAB VISIT (OUTPATIENT)
Dept: LAB | Facility: OTHER | Age: 85
End: 2020-10-20
Payer: MEDICARE

## 2020-10-20 DIAGNOSIS — Z03.818 ENCOUNTER FOR OBSERVATION FOR SUSPECTED EXPOSURE TO OTHER BIOLOGICAL AGENTS RULED OUT: ICD-10-CM

## 2020-10-20 PROCEDURE — U0003 INFECTIOUS AGENT DETECTION BY NUCLEIC ACID (DNA OR RNA); SEVERE ACUTE RESPIRATORY SYNDROME CORONAVIRUS 2 (SARS-COV-2) (CORONAVIRUS DISEASE [COVID-19]), AMPLIFIED PROBE TECHNIQUE, MAKING USE OF HIGH THROUGHPUT TECHNOLOGIES AS DESCRIBED BY CMS-2020-01-R: HCPCS

## 2020-10-21 LAB — SARS-COV-2 RNA RESP QL NAA+PROBE: NOT DETECTED

## 2020-11-12 RX ORDER — LOSARTAN POTASSIUM 100 MG/1
100 TABLET ORAL DAILY
Qty: 90 TABLET | Refills: 3 | OUTPATIENT
Start: 2020-11-12

## 2020-12-11 ENCOUNTER — LAB VISIT (OUTPATIENT)
Dept: LAB | Facility: OTHER | Age: 85
End: 2020-12-11
Payer: MEDICARE

## 2020-12-11 DIAGNOSIS — Z03.818 ENCOUNTER FOR OBSERVATION FOR SUSPECTED EXPOSURE TO OTHER BIOLOGICAL AGENTS RULED OUT: ICD-10-CM

## 2020-12-11 PROCEDURE — U0003 INFECTIOUS AGENT DETECTION BY NUCLEIC ACID (DNA OR RNA); SEVERE ACUTE RESPIRATORY SYNDROME CORONAVIRUS 2 (SARS-COV-2) (CORONAVIRUS DISEASE [COVID-19]), AMPLIFIED PROBE TECHNIQUE, MAKING USE OF HIGH THROUGHPUT TECHNOLOGIES AS DESCRIBED BY CMS-2020-01-R: HCPCS

## 2020-12-13 LAB — SARS-COV-2 RNA RESP QL NAA+PROBE: NOT DETECTED

## 2020-12-15 ENCOUNTER — TELEPHONE (OUTPATIENT)
Dept: CARDIOLOGY | Facility: CLINIC | Age: 85
End: 2020-12-15

## 2020-12-15 NOTE — TELEPHONE ENCOUNTER
----- Message from Linda Brizuela sent at 12/15/2020  9:53 AM CST -----  Regarding: jess Friend calling to see if you got covid orders   They faxed them several times   11/20 12/3 12/9    [phone] Donna

## 2020-12-15 NOTE — TELEPHONE ENCOUNTER
Spoke with Margoth informed her that we received covid orders Dr. ROGERS has been sick and he will sign them tomorrow and we will fax.

## 2020-12-18 ENCOUNTER — LAB VISIT (OUTPATIENT)
Dept: LAB | Facility: OTHER | Age: 85
End: 2020-12-18
Payer: MEDICARE

## 2020-12-18 DIAGNOSIS — Z03.818 ENCOUNTER FOR OBSERVATION FOR SUSPECTED EXPOSURE TO OTHER BIOLOGICAL AGENTS RULED OUT: ICD-10-CM

## 2020-12-18 PROCEDURE — U0003 INFECTIOUS AGENT DETECTION BY NUCLEIC ACID (DNA OR RNA); SEVERE ACUTE RESPIRATORY SYNDROME CORONAVIRUS 2 (SARS-COV-2) (CORONAVIRUS DISEASE [COVID-19]), AMPLIFIED PROBE TECHNIQUE, MAKING USE OF HIGH THROUGHPUT TECHNOLOGIES AS DESCRIBED BY CMS-2020-01-R: HCPCS

## 2020-12-20 LAB — SARS-COV-2 RNA RESP QL NAA+PROBE: NOT DETECTED

## 2020-12-28 ENCOUNTER — LAB VISIT (OUTPATIENT)
Dept: LAB | Facility: OTHER | Age: 85
End: 2020-12-28
Payer: MEDICARE

## 2020-12-28 DIAGNOSIS — Z03.818 ENCOUNTER FOR OBSERVATION FOR SUSPECTED EXPOSURE TO OTHER BIOLOGICAL AGENTS RULED OUT: ICD-10-CM

## 2020-12-28 PROCEDURE — U0003 INFECTIOUS AGENT DETECTION BY NUCLEIC ACID (DNA OR RNA); SEVERE ACUTE RESPIRATORY SYNDROME CORONAVIRUS 2 (SARS-COV-2) (CORONAVIRUS DISEASE [COVID-19]), AMPLIFIED PROBE TECHNIQUE, MAKING USE OF HIGH THROUGHPUT TECHNOLOGIES AS DESCRIBED BY CMS-2020-01-R: HCPCS

## 2020-12-30 LAB — SARS-COV-2 RNA RESP QL NAA+PROBE: NOT DETECTED

## 2021-01-05 ENCOUNTER — LAB VISIT (OUTPATIENT)
Dept: LAB | Facility: OTHER | Age: 86
End: 2021-01-05
Payer: MEDICARE

## 2021-01-05 DIAGNOSIS — Z03.818 ENCOUNTER FOR OBSERVATION FOR SUSPECTED EXPOSURE TO OTHER BIOLOGICAL AGENTS RULED OUT: ICD-10-CM

## 2021-01-05 PROCEDURE — U0003 INFECTIOUS AGENT DETECTION BY NUCLEIC ACID (DNA OR RNA); SEVERE ACUTE RESPIRATORY SYNDROME CORONAVIRUS 2 (SARS-COV-2) (CORONAVIRUS DISEASE [COVID-19]), AMPLIFIED PROBE TECHNIQUE, MAKING USE OF HIGH THROUGHPUT TECHNOLOGIES AS DESCRIBED BY CMS-2020-01-R: HCPCS

## 2021-01-07 LAB — SARS-COV-2 RNA RESP QL NAA+PROBE: NOT DETECTED

## 2021-01-13 ENCOUNTER — LAB VISIT (OUTPATIENT)
Dept: LAB | Facility: OTHER | Age: 86
End: 2021-01-13
Payer: MEDICARE

## 2021-01-13 DIAGNOSIS — Z20.822 ENCOUNTER FOR LABORATORY TESTING FOR COVID-19 VIRUS: ICD-10-CM

## 2021-01-13 PROCEDURE — U0003 INFECTIOUS AGENT DETECTION BY NUCLEIC ACID (DNA OR RNA); SEVERE ACUTE RESPIRATORY SYNDROME CORONAVIRUS 2 (SARS-COV-2) (CORONAVIRUS DISEASE [COVID-19]), AMPLIFIED PROBE TECHNIQUE, MAKING USE OF HIGH THROUGHPUT TECHNOLOGIES AS DESCRIBED BY CMS-2020-01-R: HCPCS

## 2021-01-14 RX ORDER — APIXABAN 2.5 MG/1
2.5 TABLET, FILM COATED ORAL 2 TIMES DAILY
Qty: 180 TABLET | Refills: 3 | Status: SHIPPED | OUTPATIENT
Start: 2021-01-14 | End: 2022-01-07

## 2021-01-15 LAB — SARS-COV-2 RNA RESP QL NAA+PROBE: NOT DETECTED

## 2021-01-27 ENCOUNTER — LAB VISIT (OUTPATIENT)
Dept: LAB | Facility: OTHER | Age: 86
End: 2021-01-27
Payer: MEDICARE

## 2021-01-27 DIAGNOSIS — Z20.822 ENCOUNTER FOR LABORATORY TESTING FOR COVID-19 VIRUS: ICD-10-CM

## 2021-01-27 PROCEDURE — U0003 INFECTIOUS AGENT DETECTION BY NUCLEIC ACID (DNA OR RNA); SEVERE ACUTE RESPIRATORY SYNDROME CORONAVIRUS 2 (SARS-COV-2) (CORONAVIRUS DISEASE [COVID-19]), AMPLIFIED PROBE TECHNIQUE, MAKING USE OF HIGH THROUGHPUT TECHNOLOGIES AS DESCRIBED BY CMS-2020-01-R: HCPCS

## 2021-01-29 LAB — SARS-COV-2 RNA RESP QL NAA+PROBE: NOT DETECTED

## 2021-02-03 RX ORDER — FELODIPINE 5 MG/1
5 TABLET, EXTENDED RELEASE ORAL 2 TIMES DAILY
Qty: 180 TABLET | Refills: 3 | Status: SHIPPED | OUTPATIENT
Start: 2021-02-03 | End: 2022-02-24 | Stop reason: SDUPTHER

## 2021-02-03 RX ORDER — DIGOXIN 125 MCG
0.12 TABLET ORAL DAILY
Qty: 90 TABLET | Refills: 3 | Status: SHIPPED | OUTPATIENT
Start: 2021-02-03 | End: 2021-10-19 | Stop reason: SDUPTHER

## 2021-02-03 RX ORDER — LOSARTAN POTASSIUM 100 MG/1
100 TABLET ORAL DAILY
Qty: 90 TABLET | Refills: 3 | Status: SHIPPED | OUTPATIENT
Start: 2021-02-03 | End: 2022-02-18 | Stop reason: SDUPTHER

## 2021-02-03 RX ORDER — METOPROLOL SUCCINATE 25 MG/1
25 TABLET, EXTENDED RELEASE ORAL DAILY
Qty: 90 TABLET | Refills: 3 | Status: SHIPPED | OUTPATIENT
Start: 2021-02-03 | End: 2021-09-15 | Stop reason: SDUPTHER

## 2021-02-10 ENCOUNTER — LAB VISIT (OUTPATIENT)
Dept: LAB | Facility: OTHER | Age: 86
End: 2021-02-10
Attending: INTERNAL MEDICINE
Payer: MEDICARE

## 2021-02-10 DIAGNOSIS — Z20.822 ENCOUNTER FOR LABORATORY TESTING FOR COVID-19 VIRUS: ICD-10-CM

## 2021-02-10 PROCEDURE — U0003 INFECTIOUS AGENT DETECTION BY NUCLEIC ACID (DNA OR RNA); SEVERE ACUTE RESPIRATORY SYNDROME CORONAVIRUS 2 (SARS-COV-2) (CORONAVIRUS DISEASE [COVID-19]), AMPLIFIED PROBE TECHNIQUE, MAKING USE OF HIGH THROUGHPUT TECHNOLOGIES AS DESCRIBED BY CMS-2020-01-R: HCPCS

## 2021-02-11 LAB — SARS-COV-2 RNA RESP QL NAA+PROBE: NOT DETECTED

## 2021-04-26 ENCOUNTER — TELEPHONE (OUTPATIENT)
Dept: FAMILY MEDICINE | Facility: CLINIC | Age: 86
End: 2021-04-26

## 2021-05-25 ENCOUNTER — OFFICE VISIT (OUTPATIENT)
Dept: FAMILY MEDICINE | Facility: CLINIC | Age: 86
End: 2021-05-25
Payer: MEDICARE

## 2021-05-25 VITALS
BODY MASS INDEX: 21.62 KG/M2 | SYSTOLIC BLOOD PRESSURE: 122 MMHG | DIASTOLIC BLOOD PRESSURE: 72 MMHG | WEIGHT: 122 LBS | HEIGHT: 63 IN | HEART RATE: 64 BPM | OXYGEN SATURATION: 100 %

## 2021-05-25 DIAGNOSIS — I48.0 PAROXYSMAL A-FIB: Primary | ICD-10-CM

## 2021-05-25 DIAGNOSIS — Z13.820 SCREENING FOR OSTEOPOROSIS: ICD-10-CM

## 2021-05-25 DIAGNOSIS — Z78.0 MENOPAUSE: ICD-10-CM

## 2021-05-25 DIAGNOSIS — I10 ESSENTIAL HYPERTENSION: ICD-10-CM

## 2021-05-25 PROCEDURE — 99204 PR OFFICE/OUTPT VISIT, NEW, LEVL IV, 45-59 MIN: ICD-10-PCS | Mod: S$GLB,,, | Performed by: NURSE PRACTITIONER

## 2021-05-25 PROCEDURE — 99204 OFFICE O/P NEW MOD 45 MIN: CPT | Mod: S$GLB,,, | Performed by: NURSE PRACTITIONER

## 2021-05-25 RX ORDER — AMOXICILLIN 250 MG
1 CAPSULE ORAL DAILY
COMMUNITY

## 2021-06-04 ENCOUNTER — TELEPHONE (OUTPATIENT)
Dept: FAMILY MEDICINE | Facility: CLINIC | Age: 86
End: 2021-06-04

## 2021-07-22 ENCOUNTER — TELEPHONE (OUTPATIENT)
Dept: FAMILY MEDICINE | Facility: CLINIC | Age: 86
End: 2021-07-22

## 2021-07-23 LAB
ALBUMIN SERPL-MCNC: 4.4 G/DL (ref 3.6–5.1)
ALBUMIN/GLOB SERPL: 1.6 (CALC) (ref 1–2.5)
ALP SERPL-CCNC: 98 U/L (ref 37–153)
ALT SERPL-CCNC: 19 U/L (ref 6–29)
AST SERPL-CCNC: 25 U/L (ref 10–35)
BASOPHILS # BLD AUTO: 42 CELLS/UL (ref 0–200)
BASOPHILS NFR BLD AUTO: 0.5 %
BILIRUB SERPL-MCNC: 0.6 MG/DL (ref 0.2–1.2)
BUN SERPL-MCNC: 14 MG/DL (ref 7–25)
BUN/CREAT SERPL: NORMAL (CALC) (ref 6–22)
CALCIUM SERPL-MCNC: 9.4 MG/DL (ref 8.6–10.4)
CHLORIDE SERPL-SCNC: 99 MMOL/L (ref 98–110)
CHOLEST SERPL-MCNC: 251 MG/DL
CHOLEST/HDLC SERPL: 3.6 (CALC)
CO2 SERPL-SCNC: 28 MMOL/L (ref 20–32)
CREAT SERPL-MCNC: 0.8 MG/DL (ref 0.6–0.88)
EOSINOPHIL # BLD AUTO: 160 CELLS/UL (ref 15–500)
EOSINOPHIL NFR BLD AUTO: 1.9 %
ERYTHROCYTE [DISTWIDTH] IN BLOOD BY AUTOMATED COUNT: 13.5 % (ref 11–15)
GLOBULIN SER CALC-MCNC: 2.8 G/DL (CALC) (ref 1.9–3.7)
GLUCOSE SERPL-MCNC: 86 MG/DL (ref 65–139)
HCT VFR BLD AUTO: 43.9 % (ref 35–45)
HDLC SERPL-MCNC: 70 MG/DL
HGB BLD-MCNC: 14.5 G/DL (ref 11.7–15.5)
LDLC SERPL CALC-MCNC: 147 MG/DL (CALC)
LYMPHOCYTES # BLD AUTO: 1495 CELLS/UL (ref 850–3900)
LYMPHOCYTES NFR BLD AUTO: 17.8 %
MCH RBC QN AUTO: 30.2 PG (ref 27–33)
MCHC RBC AUTO-ENTMCNC: 33 G/DL (ref 32–36)
MCV RBC AUTO: 91.5 FL (ref 80–100)
MONOCYTES # BLD AUTO: 571 CELLS/UL (ref 200–950)
MONOCYTES NFR BLD AUTO: 6.8 %
NEUTROPHILS # BLD AUTO: 6132 CELLS/UL (ref 1500–7800)
NEUTROPHILS NFR BLD AUTO: 73 %
NONHDLC SERPL-MCNC: 181 MG/DL (CALC)
PLATELET # BLD AUTO: 269 THOUSAND/UL (ref 140–400)
PMV BLD REES-ECKER: 10.7 FL (ref 7.5–12.5)
POTASSIUM SERPL-SCNC: 3.8 MMOL/L (ref 3.5–5.3)
PROT SERPL-MCNC: 7.2 G/DL (ref 6.1–8.1)
RBC # BLD AUTO: 4.8 MILLION/UL (ref 3.8–5.1)
SODIUM SERPL-SCNC: 137 MMOL/L (ref 135–146)
TRIGL SERPL-MCNC: 196 MG/DL
TSH SERPL-ACNC: 1.16 MIU/L (ref 0.4–4.5)
WBC # BLD AUTO: 8.4 THOUSAND/UL (ref 3.8–10.8)

## 2021-09-08 ENCOUNTER — OUTSIDE PLACE OF SERVICE (OUTPATIENT)
Dept: FAMILY MEDICINE | Facility: CLINIC | Age: 86
End: 2021-09-08
Payer: MEDICARE

## 2021-09-08 DIAGNOSIS — H35.30 ARMD (AGE-RELATED MACULAR DEGENERATION), BILATERAL: ICD-10-CM

## 2021-09-08 DIAGNOSIS — I10 ESSENTIAL HYPERTENSION: ICD-10-CM

## 2021-09-08 DIAGNOSIS — C43.9 SKIN CANCER (MELANOMA): ICD-10-CM

## 2021-09-08 DIAGNOSIS — I34.1 MVP (MITRAL VALVE PROLAPSE): ICD-10-CM

## 2021-09-08 DIAGNOSIS — Z74.1 NEED FOR ASSISTANCE WITH PERSONAL CARE: ICD-10-CM

## 2021-09-08 DIAGNOSIS — S82.891B: ICD-10-CM

## 2021-09-08 DIAGNOSIS — I48.91 A-FIB: ICD-10-CM

## 2021-09-08 PROCEDURE — 99350 HOME/RES VST EST HIGH MDM 60: CPT | Mod: S$GLB,,, | Performed by: FAMILY MEDICINE

## 2021-09-08 PROCEDURE — 99350 PR HOME VISIT,ESTAB PATIENT,LEVEL IV: ICD-10-PCS | Mod: S$GLB,,, | Performed by: FAMILY MEDICINE

## 2021-09-15 RX ORDER — METOPROLOL SUCCINATE 25 MG/1
25 TABLET, EXTENDED RELEASE ORAL DAILY
Qty: 90 TABLET | Refills: 1 | Status: SHIPPED | OUTPATIENT
Start: 2021-09-15 | End: 2022-03-15 | Stop reason: SDUPTHER

## 2021-10-19 RX ORDER — DIGOXIN 125 MCG
0.12 TABLET ORAL DAILY
Qty: 90 TABLET | Refills: 3 | Status: SHIPPED | OUTPATIENT
Start: 2021-10-19 | End: 2022-03-15 | Stop reason: SDUPTHER

## 2021-12-10 ENCOUNTER — OUTSIDE PLACE OF SERVICE (OUTPATIENT)
Dept: FAMILY MEDICINE | Facility: CLINIC | Age: 86
End: 2021-12-10
Payer: MEDICARE

## 2021-12-10 DIAGNOSIS — N32.81 OAB (OVERACTIVE BLADDER): Primary | ICD-10-CM

## 2021-12-10 DIAGNOSIS — Z87.81 S/P ORIF (OPEN REDUCTION INTERNAL FIXATION) FRACTURE: ICD-10-CM

## 2021-12-10 DIAGNOSIS — N32.81 OAB (OVERACTIVE BLADDER): ICD-10-CM

## 2021-12-10 DIAGNOSIS — R54 ADVANCED AGE: ICD-10-CM

## 2021-12-10 DIAGNOSIS — Z98.890 S/P ORIF (OPEN REDUCTION INTERNAL FIXATION) FRACTURE: ICD-10-CM

## 2021-12-10 DIAGNOSIS — I48.0 PAROXYSMAL ATRIAL FIBRILLATION: ICD-10-CM

## 2021-12-10 PROCEDURE — 99349 PR HOME VISIT,ESTAB PATIENT,LEVEL III: ICD-10-PCS | Mod: S$GLB,,, | Performed by: FAMILY MEDICINE

## 2021-12-10 PROCEDURE — 99349 HOME/RES VST EST MOD MDM 40: CPT | Mod: S$GLB,,, | Performed by: FAMILY MEDICINE

## 2021-12-10 RX ORDER — OXYBUTYNIN CHLORIDE 5 MG/1
5 TABLET ORAL NIGHTLY PRN
Qty: 30 TABLET | Refills: 3 | Status: SHIPPED | OUTPATIENT
Start: 2021-12-10 | End: 2021-12-10 | Stop reason: SDUPTHER

## 2021-12-10 RX ORDER — OXYBUTYNIN CHLORIDE 5 MG/1
5 TABLET ORAL NIGHTLY PRN
Qty: 30 TABLET | Refills: 3 | Status: SHIPPED | OUTPATIENT
Start: 2021-12-10 | End: 2021-12-13

## 2021-12-13 ENCOUNTER — TELEPHONE (OUTPATIENT)
Dept: FAMILY MEDICINE | Facility: CLINIC | Age: 86
End: 2021-12-13
Payer: MEDICARE

## 2022-02-18 RX ORDER — LOSARTAN POTASSIUM 100 MG/1
100 TABLET ORAL DAILY
Qty: 90 TABLET | Refills: 3 | Status: SHIPPED | OUTPATIENT
Start: 2022-02-18 | End: 2023-01-13 | Stop reason: SDUPTHER

## 2022-02-18 NOTE — TELEPHONE ENCOUNTER
----- Message from Claudia Fenton sent at 2/18/2022 10:41 AM CST -----   10:13  [facility] does not have Losartan in stock. [facility] [address] #  [phone]  Joseline #  [phone] . Joseline is changing Pharmacy's. [facility] [address]  [place]

## 2022-02-24 RX ORDER — FELODIPINE 5 MG/1
5 TABLET, EXTENDED RELEASE ORAL 2 TIMES DAILY
Qty: 180 TABLET | Refills: 1 | Status: SHIPPED | OUTPATIENT
Start: 2022-02-24 | End: 2022-08-22 | Stop reason: SDUPTHER

## 2022-03-08 ENCOUNTER — OUTSIDE PLACE OF SERVICE (OUTPATIENT)
Dept: FAMILY MEDICINE | Facility: CLINIC | Age: 87
End: 2022-03-08
Payer: MEDICARE

## 2022-03-08 DIAGNOSIS — G47.00 INSOMNIA: ICD-10-CM

## 2022-03-08 DIAGNOSIS — I10 ESSENTIAL HYPERTENSION: ICD-10-CM

## 2022-03-08 DIAGNOSIS — I48.91 A-FIB: ICD-10-CM

## 2022-03-08 PROCEDURE — 99349 PR HOME VISIT,ESTAB PATIENT,LEVEL III: ICD-10-PCS | Mod: S$GLB,,, | Performed by: FAMILY MEDICINE

## 2022-03-08 PROCEDURE — 99349 HOME/RES VST EST MOD MDM 40: CPT | Mod: S$GLB,,, | Performed by: FAMILY MEDICINE

## 2022-03-09 RX ORDER — TRAZODONE HYDROCHLORIDE 50 MG/1
50 TABLET ORAL NIGHTLY
Qty: 30 TABLET | Refills: 2 | Status: SHIPPED | OUTPATIENT
Start: 2022-03-09 | End: 2023-03-09

## 2022-03-09 NOTE — TELEPHONE ENCOUNTER
----- Message from Mitzi Davis sent at 3/9/2022  9:31 AM CST -----  Vm- daughter is calling and dr. De Luna saw her last night and she discussed the sleep aid and they would like it called in. Please let her know   [facility]   [phone] abhilash ibarra

## 2022-03-09 NOTE — TELEPHONE ENCOUNTER
LMOR for daughter letting her know Dr. De Luna will send in a prescription today - I let her know on the recording Dr. De Luna does not send medications in until the end of the day but it will be sent before we leave.

## 2022-03-15 RX ORDER — APIXABAN 2.5 MG/1
2.5 TABLET, FILM COATED ORAL 2 TIMES DAILY
Qty: 180 TABLET | Refills: 3 | Status: SHIPPED | OUTPATIENT
Start: 2022-03-15 | End: 2023-03-24 | Stop reason: SDUPTHER

## 2022-03-15 RX ORDER — METOPROLOL SUCCINATE 25 MG/1
25 TABLET, EXTENDED RELEASE ORAL DAILY
Qty: 90 TABLET | Refills: 1 | Status: SHIPPED | OUTPATIENT
Start: 2022-03-15 | End: 2022-08-25 | Stop reason: SDUPTHER

## 2022-03-15 RX ORDER — DIGOXIN 125 MCG
0.12 TABLET ORAL DAILY
Qty: 90 TABLET | Refills: 3 | Status: SHIPPED | OUTPATIENT
Start: 2022-03-15 | End: 2023-07-31 | Stop reason: SDUPTHER

## 2022-03-15 NOTE — TELEPHONE ENCOUNTER
Joseline called and LMOR for pt to send in refills to [facility]   Metoprolol Succinate 25mg, states she was taking it but she hasn't been taking it recently. States she doesn't know how long she hasn't been taking it just knows she hasn't had a bottle for it.       Digoxin .125mg  Eliquis 5mg  -- both prescriptions at another pharmacy but she wants them both resent to [facility]  [phone] call back number./

## 2022-04-21 ENCOUNTER — TELEPHONE (OUTPATIENT)
Dept: FAMILY MEDICINE | Facility: CLINIC | Age: 87
End: 2022-04-21

## 2022-04-21 NOTE — TELEPHONE ENCOUNTER
----- Message from Mitzi Davis sent at 4/21/2022 11:58 AM CDT -----  Vm- daughter richard is calling about two digoxin bottles she has 0.125 mg and 125 mcg. Are they the same as she is taking 2 every day.   [phone]

## 2022-07-11 NOTE — TELEPHONE ENCOUNTER
----- Message from Radha Pierce sent at 7/11/2022 12:27 PM CDT -----  Type:  Needs Medical Advice    Who Called: PT'S DAUGHTER URSULA  Symptoms (please be specific):    How long has patient had these symptoms:    Pharmacy name and phone #:    Would the patient rather a call back or a response via [facility]?   Best Call Back Number: [phone]  Additional Information: WANTS TO CHANGE VIRTUAL APPT 7/27 TO IN PERSON FOR 1ST OR LATE APPT OF DAY        Spoke with Joseline regarding her request for the patient's spine/lumbar x-ray from the previous week.Informed Joseline the radiology apartment would make a CD copy for her and call her when ready. Joseline verbalized understanding.

## 2022-08-22 RX ORDER — FELODIPINE 5 MG/1
5 TABLET, EXTENDED RELEASE ORAL 2 TIMES DAILY
Qty: 180 TABLET | Refills: 1 | Status: SHIPPED | OUTPATIENT
Start: 2022-08-22 | End: 2023-02-23 | Stop reason: SDUPTHER

## 2022-08-22 NOTE — TELEPHONE ENCOUNTER
----- Message from Mitzi Davis sent at 8/22/2022 10:49 AM CDT -----  Vm- mallorie daughter is calling for refill on felodipine 5mg twice a day. She has changed pharmacies.   [facility]   [phone]

## 2022-08-25 RX ORDER — METOPROLOL SUCCINATE 25 MG/1
25 TABLET, EXTENDED RELEASE ORAL DAILY
Qty: 90 TABLET | Refills: 1 | Status: SHIPPED | OUTPATIENT
Start: 2022-08-25 | End: 2023-02-03 | Stop reason: SDUPTHER

## 2022-08-25 NOTE — TELEPHONE ENCOUNTER
----- Message from Zandra Kelsey MA sent at 8/25/2022 10:25 AM CDT -----    ----- Message -----  From: Mitzi Davis  Sent: 8/25/2022  10:19 AM CDT  To: Jamie De Luna Staff    Vm- pt daughter richard is calling for refill on metoprolol   [facility]   [phone]

## 2023-01-13 RX ORDER — LOSARTAN POTASSIUM 100 MG/1
100 TABLET ORAL DAILY
Qty: 90 TABLET | Refills: 3 | Status: SHIPPED | OUTPATIENT
Start: 2023-01-13 | End: 2024-01-05 | Stop reason: SDUPTHER

## 2023-01-13 NOTE — TELEPHONE ENCOUNTER
Pt is needing a refill on her Losartan. Pt was last seen at [facility] on 03/08/2022.

## 2023-01-13 NOTE — TELEPHONE ENCOUNTER
----- Message from Mitzi Davis sent at 1/13/2023 11:30 AM CST -----  Vm- daughter richard is calling for refill on losartan   [facility]  [phone]

## 2023-01-17 ENCOUNTER — OUTSIDE PLACE OF SERVICE (OUTPATIENT)
Dept: FAMILY MEDICINE | Facility: CLINIC | Age: 88
End: 2023-01-17
Payer: MEDICARE

## 2023-01-17 DIAGNOSIS — I10 ESSENTIAL HYPERTENSION: ICD-10-CM

## 2023-01-17 DIAGNOSIS — Z99.89 WALKER AS AMBULATION AID: ICD-10-CM

## 2023-01-17 DIAGNOSIS — I34.1 MVP (MITRAL VALVE PROLAPSE): ICD-10-CM

## 2023-01-17 DIAGNOSIS — I48.20 CHRONIC ATRIAL FIBRILLATION: Primary | ICD-10-CM

## 2023-01-17 PROCEDURE — 99349 HOME/RES VST EST MOD MDM 40: CPT | Mod: S$GLB,,, | Performed by: FAMILY MEDICINE

## 2023-01-17 PROCEDURE — 99349 PR HOME VISIT,ESTAB PATIENT,LEVEL III: ICD-10-PCS | Mod: S$GLB,,, | Performed by: FAMILY MEDICINE

## 2023-02-03 ENCOUNTER — TELEPHONE (OUTPATIENT)
Dept: FAMILY MEDICINE | Facility: CLINIC | Age: 88
End: 2023-02-03

## 2023-02-03 RX ORDER — METOPROLOL SUCCINATE 25 MG/1
25 TABLET, EXTENDED RELEASE ORAL DAILY
Qty: 90 TABLET | Refills: 3 | Status: SHIPPED | OUTPATIENT
Start: 2023-02-03 | End: 2024-02-29 | Stop reason: SDUPTHER

## 2023-02-03 NOTE — TELEPHONE ENCOUNTER
----- Message from Sangita Moreira sent at 2/3/2023 10:29 AM CST -----  Refill on: metoprolol succinate    Per daughter [facility] [phone]    [id number] [place] [place] - [address]

## 2023-02-03 NOTE — TELEPHONE ENCOUNTER
----- Message from Zandra Kelsey MA sent at 2/3/2023 11:00 AM CST -----    ----- Message -----  From: Mitzi Davis  Sent: 2/3/2023  10:58 AM CST  To: Jamie De Luna Staff    [facility] is calling for refill on metoprolol   [facility]  [phone]

## 2023-02-23 RX ORDER — FELODIPINE 5 MG/1
5 TABLET, EXTENDED RELEASE ORAL 2 TIMES DAILY
Qty: 180 TABLET | Refills: 3 | Status: SHIPPED | OUTPATIENT
Start: 2023-02-23 | End: 2024-02-26 | Stop reason: SDUPTHER

## 2023-02-23 NOTE — TELEPHONE ENCOUNTER
----- Message from Mitzi Davis sent at 2/23/2023  1:00 PM CST -----  Vm- richard spilling is calling for refill on felodipine   [facility]  [phone]

## 2023-03-24 DIAGNOSIS — I48.91 ATRIAL FIBRILLATION, UNSPECIFIED TYPE: Primary | ICD-10-CM

## 2023-03-24 RX ORDER — APIXABAN 2.5 MG/1
2.5 TABLET, FILM COATED ORAL 2 TIMES DAILY
Qty: 180 TABLET | Refills: 3 | Status: SHIPPED | OUTPATIENT
Start: 2023-03-24

## 2023-03-24 NOTE — TELEPHONE ENCOUNTER
----- Message from Claudia Fenton sent at 3/24/2023 10:21 AM CDT -----   9:05  [facility]  Patients daughter  [facility] #    [phone] [place]

## 2023-07-31 RX ORDER — DIGOXIN 125 MCG
0.12 TABLET ORAL DAILY
Qty: 90 TABLET | Refills: 3 | Status: SHIPPED | OUTPATIENT
Start: 2023-07-31

## 2023-07-31 NOTE — TELEPHONE ENCOUNTER
----- Message from Mizti Davis sent at 7/31/2023  9:46 AM CDT -----  Vm- daughter richard is calling for refill on digoxin  [facility]   [phone]

## 2023-08-10 ENCOUNTER — OUTSIDE PLACE OF SERVICE (OUTPATIENT)
Dept: FAMILY MEDICINE | Facility: CLINIC | Age: 88
End: 2023-08-10
Payer: MEDICARE

## 2023-08-10 PROCEDURE — 99349 PR HOME VISIT,ESTAB PATIENT,LEVEL III: ICD-10-PCS | Mod: S$GLB,,, | Performed by: FAMILY MEDICINE

## 2023-08-10 PROCEDURE — 99349 HOME/RES VST EST MOD MDM 40: CPT | Mod: S$GLB,,, | Performed by: FAMILY MEDICINE

## 2024-01-05 RX ORDER — LOSARTAN POTASSIUM 100 MG/1
100 TABLET ORAL DAILY
Qty: 90 TABLET | Refills: 0 | Status: SHIPPED | OUTPATIENT
Start: 2024-01-05

## 2024-01-05 NOTE — TELEPHONE ENCOUNTER
----- Message from Mitzi Davis sent at 1/5/2024 11:07 AM CST -----  - 10:07-daughter richard is calling for refill on losartan potassium 100 mg   [facility]   [phone]

## 2024-02-02 ENCOUNTER — OUTSIDE PLACE OF SERVICE (OUTPATIENT)
Dept: FAMILY MEDICINE | Facility: CLINIC | Age: 89
End: 2024-02-02
Payer: MEDICARE

## 2024-02-02 DIAGNOSIS — Z74.1 NEED FOR ASSISTANCE WITH PERSONAL CARE: ICD-10-CM

## 2024-02-02 DIAGNOSIS — H35.30 ARMD (AGE-RELATED MACULAR DEGENERATION), BILATERAL: ICD-10-CM

## 2024-02-02 DIAGNOSIS — I48.20 CHRONIC ATRIAL FIBRILLATION: Primary | ICD-10-CM

## 2024-02-02 DIAGNOSIS — I10 ESSENTIAL HYPERTENSION: ICD-10-CM

## 2024-02-02 PROCEDURE — 99349 HOME/RES VST EST MOD MDM 40: CPT | Mod: S$GLB,,, | Performed by: FAMILY MEDICINE

## 2024-02-26 RX ORDER — FELODIPINE 5 MG/1
5 TABLET, EXTENDED RELEASE ORAL 2 TIMES DAILY
Qty: 180 TABLET | Refills: 3 | Status: SHIPPED | OUTPATIENT
Start: 2024-02-26

## 2024-02-26 NOTE — TELEPHONE ENCOUNTER
----- Message from Mitzi Davis sent at 2/26/2024 10:46 AM CST -----  - richard danyelantonella is calling for refill on felodipine   [facility]   [phone]

## 2024-02-29 RX ORDER — METOPROLOL SUCCINATE 25 MG/1
25 TABLET, EXTENDED RELEASE ORAL DAILY
Qty: 90 TABLET | Refills: 3 | Status: SHIPPED | OUTPATIENT
Start: 2024-02-29

## 2024-02-29 NOTE — TELEPHONE ENCOUNTER
----- Message from Mela Hsu sent at 2/29/2024 11:18 AM CST -----  VM: 1045am     Pt needs a refill for succer 25mg tablets.    [phone]

## 2024-02-29 NOTE — TELEPHONE ENCOUNTER
Spoke with pt daughter and verified pt needing refill on Metoprolol.     Pt last seen at [facility] on 2/2/2024.    Rx order set up.

## 2024-02-29 NOTE — TELEPHONE ENCOUNTER
----- Message from Nery Donovan LPN sent at 2/29/2024 12:48 PM CST -----    ----- Message -----  From: Claudia Fenton  Sent: 2/29/2024  12:34 PM CST  To: Jamie Rodas is   returning Kiya's call. When she left a call she said the call was for Latosha. There is no Latosha there. Joseline's # [phone] [facility]

## 2024-04-04 RX ORDER — LOSARTAN POTASSIUM 100 MG/1
100 TABLET ORAL DAILY
Qty: 90 TABLET | Refills: 2 | Status: SHIPPED | OUTPATIENT
Start: 2024-04-04

## 2024-04-04 NOTE — TELEPHONE ENCOUNTER
----- Message from Mitzi Davis sent at 4/4/2024 10:41 AM CDT -----  Vm- daughter richard is calling for refill on losartan   [facility]   [phone]

## 2024-04-04 NOTE — TELEPHONE ENCOUNTER
Pt is needing a refill on her losartan. Dr. De Luna saw pt at [facility] on 02/02/2024.

## 2024-04-24 DIAGNOSIS — I48.91 ATRIAL FIBRILLATION, UNSPECIFIED TYPE: ICD-10-CM

## 2024-04-24 RX ORDER — APIXABAN 2.5 MG/1
2.5 TABLET, FILM COATED ORAL 2 TIMES DAILY
Qty: 180 TABLET | Refills: 3 | Status: SHIPPED | OUTPATIENT
Start: 2024-04-24

## 2024-04-24 NOTE — TELEPHONE ENCOUNTER
Last seen by cruz 2/2/24 at [facility]. Looks like Eliquis last sent for a year supply in 2023. Set up if okay to send.

## 2024-04-24 NOTE — TELEPHONE ENCOUNTER
----- Message from Mitzi Davis sent at 4/24/2024 11:43 AM CDT -----  Vm- 10:09- pt daughter richard is calling for refill on eliquis 2.5   [facility]   [phone]

## 2024-05-23 ENCOUNTER — TELEPHONE (OUTPATIENT)
Dept: FAMILY MEDICINE | Facility: CLINIC | Age: 89
End: 2024-05-23
Payer: MEDICARE

## 2024-05-23 DIAGNOSIS — I10 ESSENTIAL HYPERTENSION: ICD-10-CM

## 2024-05-23 DIAGNOSIS — Z79.899 ENCOUNTER FOR LONG-TERM (CURRENT) USE OF OTHER MEDICATIONS: Primary | ICD-10-CM

## 2024-05-23 DIAGNOSIS — I48.20 CHRONIC ATRIAL FIBRILLATION: ICD-10-CM

## 2024-05-23 RX ORDER — DIGOXIN 125 MCG
0.12 TABLET ORAL DAILY
Qty: 90 TABLET | Refills: 1 | Status: SHIPPED | OUTPATIENT
Start: 2024-05-23

## 2024-05-23 NOTE — TELEPHONE ENCOUNTER
Refill was sent but she really needs updated labs, looks like it has been several years-CBC, CMP, digoxin, TSH

## 2024-05-23 NOTE — TELEPHONE ENCOUNTER
----- Message from Zandra Kelsey MA sent at 5/23/2024  9:38 AM CDT -----    ----- Message -----  From: Mitzi Davis  Sent: 5/23/2024   9:38 AM CDT  To: Jamie De Luna Staff    - 9:36-pt needs refill on digoxin   [phone]

## 2024-06-11 NOTE — ADDENDUM NOTE
Addended by: CARLOS CAMPBELL on: 10/13/2017 11:46 AM     Modules accepted: Orders     Statement Selected
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
## 2024-07-02 ENCOUNTER — TELEPHONE (OUTPATIENT)
Dept: FAMILY MEDICINE | Facility: CLINIC | Age: 89
End: 2024-07-02
Payer: MEDICARE

## 2024-07-02 NOTE — TELEPHONE ENCOUNTER
Spoke with Ainsley at [facility] [place], states they are going out to admit the patient tomorrow and the daughter wanted her to draw the ordered labs while they are out. Faxed

## 2024-07-15 ENCOUNTER — OUTSIDE PLACE OF SERVICE (OUTPATIENT)
Dept: FAMILY MEDICINE | Facility: CLINIC | Age: 89
End: 2024-07-15
Payer: MEDICARE

## 2024-07-15 DIAGNOSIS — N39.0 UTI (URINARY TRACT INFECTION): ICD-10-CM

## 2024-07-15 DIAGNOSIS — I48.20 CHRONIC ATRIAL FIBRILLATION: ICD-10-CM
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
## 2024-07-15 DIAGNOSIS — Z74.1 NEED FOR ASSISTANCE WITH PERSONAL CARE: ICD-10-CM

## 2024-07-15 PROCEDURE — 99349 HOME/RES VST EST MOD MDM 40: CPT | Mod: S$GLB,,, | Performed by: FAMILY MEDICINE

## 2024-07-16 DIAGNOSIS — N30.00 ACUTE CYSTITIS WITHOUT HEMATURIA: Primary | ICD-10-CM

## 2024-07-16 RX ORDER — NITROFURANTOIN 25; 75 MG/1; MG/1
100 CAPSULE ORAL 2 TIMES DAILY
Qty: 20 CAPSULE | Refills: 0 | Status: SHIPPED | OUTPATIENT
Start: 2024-07-16

## 2024-07-25 ENCOUNTER — TELEPHONE (OUTPATIENT)
Dept: FAMILY MEDICINE | Facility: CLINIC | Age: 89
End: 2024-07-25
Payer: MEDICARE

## 2024-07-25 NOTE — TELEPHONE ENCOUNTER
----- Message from Nery Donovan LPN sent at 7/25/2024  9:33 AM CDT -----  Contact: Joseline    ----- Message -----  From: Tiff Boyce  Sent: 7/25/2024   9:08 AM CDT  To: Tiff Lo Staff    Pt is very weak and needs to be seen asap. oJseline @[phone]

## 2024-07-25 NOTE — TELEPHONE ENCOUNTER
Tiff spoke with Dr. Tolentino regarding patient. They wanted to make sure we can get her in first thing next week before they d/c her to f/u. States she has bilateral pleural effusions. Negative troponin. Remind me created to f/u with daughter tomorrow.

## 2024-07-25 NOTE — TELEPHONE ENCOUNTER
Spoke with patients daughter, states she fell this morning and was unable to get out of bed. She has been very weak. States the nursing staff told them she should really be seen today. States she has been feeling like this for weeks. She was put on abx for a UTI. The staff was able to get her up and get her dressed and she is sitting in the chair. The nurse at [facility] is recommending they take her to an urgent care right now because she was so weak this morning she was not able to stand up so it is getting worse. The daughter is agreeable to this because she wants to make sure there is nothing acute going on. Tiff HEADLEY aware.

## 2024-07-25 NOTE — TELEPHONE ENCOUNTER
----- Message from Claudia Fenton sent at 7/25/2024  9:31 AM CDT -----  Joseline the patients daughter called about 15  minutes ago. She really wants her to be seen here. Joseline # [phone] [facility]

## 2024-07-26 ENCOUNTER — TELEPHONE (OUTPATIENT)
Dept: FAMILY MEDICINE | Facility: CLINIC | Age: 89
End: 2024-07-26

## 2024-07-26 ENCOUNTER — DOCUMENT SCAN (OUTPATIENT)
Dept: HOME HEALTH SERVICES | Facility: HOSPITAL | Age: 89
End: 2024-07-26
Payer: MEDICARE

## 2024-07-26 NOTE — TELEPHONE ENCOUNTER
----- Message from Nery Donovan LPN sent at 7/26/2024  9:04 AM CDT -----    ----- Message -----  From: Mela Hsu  Sent: 7/26/2024   8:53 AM CDT  To: Tiff Lo Staff    Vm: [phone]    Pt's daughter called and would like a call back. The pt was in a urgent care yesterday and was told to go to [facility], but they refused.     Joseline: [phone]

## 2024-07-26 NOTE — TELEPHONE ENCOUNTER
"Spoke with patients daughter, states the patient is not in any pain, she is very comfortable. Daughter states "she is 89 years old and has a DNR, I was not going to let them poke and prod at her." States they wanted to have her admitted and do all kinds of testing and she did not feel like that was necessary. She is going to bring the records by today so we can have Dr. De Luna review them. Daughter is refusing OV here, states it's too hard to get her into the office and that she doesn't like to leave the facility. She would like Dr. De Luna to review records on Tuesday and determine if she really needs to be seen quickly or not.     Remind me created.   "

## 2024-07-26 NOTE — TELEPHONE ENCOUNTER
"----- Message from Lilliam Hightower MA sent at 7/26/2024 10:23 AM CDT -----  Patients daughter, Joseline Rodas  is here to drop off the "orders and results".   Also she would like to leave the Physician Orders for scope of treatment, Health Care Power of  and the Declaration for the Withholding and Withdrawing of Life Sustaining Procedures for the Competent Adult forms. She is not sure what to do with these forms but she would like for the Dr to take a look at them just in case there is something that he needs to sign.  Joseline Rodas's Phone number [phone]    Folder was given to Zandra"

## 2024-07-30 ENCOUNTER — DOCUMENT SCAN (OUTPATIENT)
Dept: HOME HEALTH SERVICES | Facility: HOSPITAL | Age: 89
End: 2024-07-30
Payer: MEDICARE

## 2024-07-30 ENCOUNTER — TELEPHONE (OUTPATIENT)
Dept: FAMILY MEDICINE | Facility: CLINIC | Age: 89
End: 2024-07-30
Payer: MEDICARE

## 2024-07-30 NOTE — TELEPHONE ENCOUNTER
----- Message from Mitzi Davis sent at 7/30/2024  7:59 AM CDT -----  - 7:45-daughter jason is calling dr uche castrejon   [phone]

## 2024-08-06 ENCOUNTER — EXTERNAL HOME HEALTH (OUTPATIENT)
Dept: HOME HEALTH SERVICES | Facility: HOSPITAL | Age: 89
End: 2024-08-06
Payer: MEDICARE

## 2024-08-07 ENCOUNTER — TELEPHONE (OUTPATIENT)
Dept: FAMILY MEDICINE | Facility: CLINIC | Age: 89
End: 2024-08-07
Payer: MEDICARE

## 2024-09-01 PROCEDURE — G0179 MD RECERTIFICATION HHA PT: HCPCS | Mod: ,,, | Performed by: FAMILY MEDICINE

## 2024-09-23 ENCOUNTER — EXTERNAL HOME HEALTH (OUTPATIENT)
Dept: HOME HEALTH SERVICES | Facility: HOSPITAL | Age: 89
End: 2024-09-23
Payer: MEDICARE

## 2024-10-06 ENCOUNTER — HOSPITAL ENCOUNTER (OUTPATIENT)
Facility: HOSPITAL | Age: 89
Discharge: NURSING, REHAB OR LONG-TERM CARE | End: 2024-10-08
Attending: EMERGENCY MEDICINE | Admitting: HOSPITALIST
Payer: MEDICARE

## 2024-10-06 DIAGNOSIS — K92.2 LOWER GI BLEEDING: Primary | ICD-10-CM

## 2024-10-06 DIAGNOSIS — R07.9 CHEST PAIN: ICD-10-CM

## 2024-10-06 DIAGNOSIS — I10 ESSENTIAL HYPERTENSION: ICD-10-CM

## 2024-10-06 DIAGNOSIS — K52.89 STERCORAL COLITIS: ICD-10-CM

## 2024-10-06 DIAGNOSIS — I48.11 LONGSTANDING PERSISTENT ATRIAL FIBRILLATION: ICD-10-CM

## 2024-10-06 DIAGNOSIS — R53.1 WEAKNESS: ICD-10-CM

## 2024-10-06 PROBLEM — I15.9 SECONDARY HYPERTENSION: Status: ACTIVE | Noted: 2024-10-06

## 2024-10-06 PROBLEM — I24.89 DEMAND ISCHEMIA: Status: ACTIVE | Noted: 2024-10-06

## 2024-10-06 LAB
ABO + RH BLD: NORMAL
ALBUMIN SERPL BCP-MCNC: 4.2 G/DL (ref 3.5–5.2)
ALP SERPL-CCNC: 98 U/L (ref 55–135)
ALT SERPL W/O P-5'-P-CCNC: 19 U/L (ref 10–44)
ANION GAP SERPL CALC-SCNC: 10 MMOL/L (ref 8–16)
APTT PPP: 31.1 SEC (ref 21–32)
AST SERPL-CCNC: 21 U/L (ref 10–40)
BASOPHILS # BLD AUTO: 0.04 K/UL (ref 0–0.2)
BASOPHILS NFR BLD: 0.4 % (ref 0–1.9)
BILIRUB SERPL-MCNC: 0.8 MG/DL (ref 0.1–1)
BLD GP AB SCN CELLS X3 SERPL QL: NORMAL
BUN SERPL-MCNC: 15 MG/DL (ref 10–30)
CALCIUM SERPL-MCNC: 9.3 MG/DL (ref 8.7–10.5)
CHLORIDE SERPL-SCNC: 97 MMOL/L (ref 95–110)
CO2 SERPL-SCNC: 26 MMOL/L (ref 23–29)
CREAT SERPL-MCNC: 0.7 MG/DL (ref 0.5–1.4)
DIFFERENTIAL METHOD BLD: ABNORMAL
EOSINOPHIL # BLD AUTO: 0.1 K/UL (ref 0–0.5)
EOSINOPHIL NFR BLD: 0.6 % (ref 0–8)
ERYTHROCYTE [DISTWIDTH] IN BLOOD BY AUTOMATED COUNT: 14.7 % (ref 11.5–14.5)
EST. GFR  (NO RACE VARIABLE): >60 ML/MIN/1.73 M^2
GLUCOSE SERPL-MCNC: 112 MG/DL (ref 70–110)
HCT VFR BLD AUTO: 41 % (ref 37–48.5)
HGB BLD-MCNC: 13.4 G/DL (ref 12–16)
IMM GRANULOCYTES # BLD AUTO: 0.03 K/UL (ref 0–0.04)
IMM GRANULOCYTES NFR BLD AUTO: 0.3 % (ref 0–0.5)
INR PPP: 1 (ref 0.8–1.2)
LYMPHOCYTES # BLD AUTO: 1 K/UL (ref 1–4.8)
LYMPHOCYTES NFR BLD: 10.2 % (ref 18–48)
MCH RBC QN AUTO: 29.2 PG (ref 27–31)
MCHC RBC AUTO-ENTMCNC: 32.7 G/DL (ref 32–36)
MCV RBC AUTO: 89 FL (ref 82–98)
MONOCYTES # BLD AUTO: 0.7 K/UL (ref 0.3–1)
MONOCYTES NFR BLD: 7.7 % (ref 4–15)
NEUTROPHILS # BLD AUTO: 7.8 K/UL (ref 1.8–7.7)
NEUTROPHILS NFR BLD: 80.8 % (ref 38–73)
NRBC BLD-RTO: 0 /100 WBC
OB PNL STL: POSITIVE
PLATELET # BLD AUTO: 282 K/UL (ref 150–450)
PMV BLD AUTO: 9.7 FL (ref 9.2–12.9)
POTASSIUM SERPL-SCNC: 4.2 MMOL/L (ref 3.5–5.1)
PROT SERPL-MCNC: 7.2 G/DL (ref 6–8.4)
PROTHROMBIN TIME: 11.5 SEC (ref 9–12.5)
RBC # BLD AUTO: 4.59 M/UL (ref 4–5.4)
SODIUM SERPL-SCNC: 133 MMOL/L (ref 136–145)
SPECIMEN OUTDATE: NORMAL
TROPONIN I SERPL HS-MCNC: 20.6 PG/ML (ref 0–14.9)
WBC # BLD AUTO: 9.66 K/UL (ref 3.9–12.7)

## 2024-10-06 PROCEDURE — 96375 TX/PRO/DX INJ NEW DRUG ADDON: CPT

## 2024-10-06 PROCEDURE — 96365 THER/PROPH/DIAG IV INF INIT: CPT

## 2024-10-06 PROCEDURE — 86850 RBC ANTIBODY SCREEN: CPT | Performed by: EMERGENCY MEDICINE

## 2024-10-06 PROCEDURE — 80053 COMPREHEN METABOLIC PANEL: CPT | Performed by: EMERGENCY MEDICINE

## 2024-10-06 PROCEDURE — G0378 HOSPITAL OBSERVATION PER HR: HCPCS

## 2024-10-06 PROCEDURE — 85610 PROTHROMBIN TIME: CPT | Performed by: EMERGENCY MEDICINE

## 2024-10-06 PROCEDURE — 85730 THROMBOPLASTIN TIME PARTIAL: CPT | Performed by: EMERGENCY MEDICINE

## 2024-10-06 PROCEDURE — 25000003 PHARM REV CODE 250: Performed by: NURSE PRACTITIONER

## 2024-10-06 PROCEDURE — 85025 COMPLETE CBC W/AUTO DIFF WBC: CPT | Performed by: EMERGENCY MEDICINE

## 2024-10-06 PROCEDURE — 96366 THER/PROPH/DIAG IV INF ADDON: CPT

## 2024-10-06 PROCEDURE — 36415 COLL VENOUS BLD VENIPUNCTURE: CPT | Performed by: EMERGENCY MEDICINE

## 2024-10-06 PROCEDURE — 86900 BLOOD TYPING SEROLOGIC ABO: CPT | Performed by: EMERGENCY MEDICINE

## 2024-10-06 PROCEDURE — 99285 EMERGENCY DEPT VISIT HI MDM: CPT | Mod: 25

## 2024-10-06 PROCEDURE — 25500020 PHARM REV CODE 255: Performed by: HOSPITALIST

## 2024-10-06 PROCEDURE — 63600175 PHARM REV CODE 636 W HCPCS: Performed by: EMERGENCY MEDICINE

## 2024-10-06 PROCEDURE — 63600175 PHARM REV CODE 636 W HCPCS: Performed by: NURSE PRACTITIONER

## 2024-10-06 PROCEDURE — 84484 ASSAY OF TROPONIN QUANT: CPT | Performed by: EMERGENCY MEDICINE

## 2024-10-06 PROCEDURE — 82272 OCCULT BLD FECES 1-3 TESTS: CPT | Performed by: EMERGENCY MEDICINE

## 2024-10-06 PROCEDURE — 86901 BLOOD TYPING SEROLOGIC RH(D): CPT | Performed by: EMERGENCY MEDICINE

## 2024-10-06 RX ORDER — AMLODIPINE BESYLATE 5 MG/1
5 TABLET ORAL DAILY
Status: DISCONTINUED | OUTPATIENT
Start: 2024-10-07 | End: 2024-10-08 | Stop reason: HOSPADM

## 2024-10-06 RX ORDER — METOPROLOL SUCCINATE 25 MG/1
25 TABLET, EXTENDED RELEASE ORAL DAILY
Status: DISCONTINUED | OUTPATIENT
Start: 2024-10-06 | End: 2024-10-08 | Stop reason: HOSPADM

## 2024-10-06 RX ORDER — SODIUM CHLORIDE 0.9 % (FLUSH) 0.9 %
2 SYRINGE (ML) INJECTION
Status: DISCONTINUED | OUTPATIENT
Start: 2024-10-06 | End: 2024-10-08 | Stop reason: HOSPADM

## 2024-10-06 RX ORDER — LANOLIN ALCOHOL/MO/W.PET/CERES
800 CREAM (GRAM) TOPICAL
Status: DISCONTINUED | OUTPATIENT
Start: 2024-10-06 | End: 2024-10-08 | Stop reason: HOSPADM

## 2024-10-06 RX ORDER — DIGOXIN 125 MCG
0.12 TABLET ORAL DAILY
Status: DISCONTINUED | OUTPATIENT
Start: 2024-10-07 | End: 2024-10-08 | Stop reason: HOSPADM

## 2024-10-06 RX ORDER — NALOXONE HCL 0.4 MG/ML
0.02 VIAL (ML) INJECTION
Status: DISCONTINUED | OUTPATIENT
Start: 2024-10-06 | End: 2024-10-08 | Stop reason: HOSPADM

## 2024-10-06 RX ORDER — TALC
6 POWDER (GRAM) TOPICAL NIGHTLY PRN
Status: DISCONTINUED | OUTPATIENT
Start: 2024-10-06 | End: 2024-10-08 | Stop reason: HOSPADM

## 2024-10-06 RX ORDER — SODIUM,POTASSIUM PHOSPHATES 280-250MG
2 POWDER IN PACKET (EA) ORAL
Status: DISCONTINUED | OUTPATIENT
Start: 2024-10-06 | End: 2024-10-08 | Stop reason: HOSPADM

## 2024-10-06 RX ORDER — IBUPROFEN 200 MG
16 TABLET ORAL
Status: DISCONTINUED | OUTPATIENT
Start: 2024-10-06 | End: 2024-10-08 | Stop reason: HOSPADM

## 2024-10-06 RX ORDER — ACETAMINOPHEN 325 MG/1
650 TABLET ORAL EVERY 8 HOURS PRN
Status: DISCONTINUED | OUTPATIENT
Start: 2024-10-06 | End: 2024-10-08 | Stop reason: HOSPADM

## 2024-10-06 RX ORDER — IBUPROFEN 200 MG
24 TABLET ORAL
Status: DISCONTINUED | OUTPATIENT
Start: 2024-10-06 | End: 2024-10-08 | Stop reason: HOSPADM

## 2024-10-06 RX ORDER — ACETAMINOPHEN 325 MG/1
650 TABLET ORAL EVERY 4 HOURS PRN
Status: DISCONTINUED | OUTPATIENT
Start: 2024-10-06 | End: 2024-10-08 | Stop reason: HOSPADM

## 2024-10-06 RX ORDER — AMOXICILLIN 250 MG
1 CAPSULE ORAL DAILY
Status: DISCONTINUED | OUTPATIENT
Start: 2024-10-07 | End: 2024-10-07

## 2024-10-06 RX ORDER — LOSARTAN POTASSIUM 50 MG/1
100 TABLET ORAL DAILY
Status: DISCONTINUED | OUTPATIENT
Start: 2024-10-07 | End: 2024-10-08 | Stop reason: HOSPADM

## 2024-10-06 RX ORDER — MORPHINE SULFATE 2 MG/ML
2 INJECTION, SOLUTION INTRAMUSCULAR; INTRAVENOUS
Status: COMPLETED | OUTPATIENT
Start: 2024-10-06 | End: 2024-10-06

## 2024-10-06 RX ORDER — HYDROCODONE BITARTRATE AND ACETAMINOPHEN 5; 325 MG/1; MG/1
1 TABLET ORAL EVERY 6 HOURS PRN
Status: DISCONTINUED | OUTPATIENT
Start: 2024-10-06 | End: 2024-10-08 | Stop reason: HOSPADM

## 2024-10-06 RX ORDER — PANTOPRAZOLE SODIUM 40 MG/10ML
40 INJECTION, POWDER, LYOPHILIZED, FOR SOLUTION INTRAVENOUS 2 TIMES DAILY
Status: DISCONTINUED | OUTPATIENT
Start: 2024-10-06 | End: 2024-10-08

## 2024-10-06 RX ORDER — ALUMINUM HYDROXIDE, MAGNESIUM HYDROXIDE, AND SIMETHICONE 1200; 120; 1200 MG/30ML; MG/30ML; MG/30ML
30 SUSPENSION ORAL 4 TIMES DAILY PRN
Status: DISCONTINUED | OUTPATIENT
Start: 2024-10-06 | End: 2024-10-08 | Stop reason: HOSPADM

## 2024-10-06 RX ORDER — GLUCAGON 1 MG
1 KIT INJECTION
Status: DISCONTINUED | OUTPATIENT
Start: 2024-10-06 | End: 2024-10-08 | Stop reason: HOSPADM

## 2024-10-06 RX ORDER — ONDANSETRON HYDROCHLORIDE 2 MG/ML
4 INJECTION, SOLUTION INTRAVENOUS EVERY 6 HOURS PRN
Status: DISCONTINUED | OUTPATIENT
Start: 2024-10-06 | End: 2024-10-08 | Stop reason: HOSPADM

## 2024-10-06 RX ADMIN — MORPHINE SULFATE 2 MG: 2 INJECTION, SOLUTION INTRAMUSCULAR; INTRAVENOUS at 11:10

## 2024-10-06 RX ADMIN — IOHEXOL 100 ML: 350 INJECTION, SOLUTION INTRAVENOUS at 02:10

## 2024-10-06 RX ADMIN — PANTOPRAZOLE SODIUM 40 MG: 40 INJECTION, POWDER, LYOPHILIZED, FOR SOLUTION INTRAVENOUS at 09:10

## 2024-10-06 RX ADMIN — PIPERACILLIN SODIUM AND TAZOBACTAM SODIUM 4.5 G: 4; .5 INJECTION, POWDER, LYOPHILIZED, FOR SOLUTION INTRAVENOUS at 05:10

## 2024-10-06 RX ADMIN — METOPROLOL SUCCINATE 25 MG: 25 TABLET, FILM COATED, EXTENDED RELEASE ORAL at 09:10

## 2024-10-06 NOTE — ED PROVIDER NOTES
Chief complaint:  Weakness (Increased weakness x1 month. Pt noted blood in toilet after bowel movement. )       History of Present Illness:  Time seen: 10:49 AM  Source:  Patient     This is a 89 y.o. female presenting with blood in stool, weakness.  The patient stays at a nursing home.  It is noted that she had blood per her rectum when she was trying to have a bowel movement.  Reportedly enough to saturate the toilet water.  She is complaining of some rectal pain.  She has no back or flank pain.  She denies any severe abdominal pain.  She denies any dysuria hematuria.  She has had increasing weakness over the past couple of days as well.  Normally she is able to weight bear with a walker but on yesterday when her daughter visited her, she felt too weak to get out of the bed.  She denies any palpitations or syncope.  She has no previous history of GI bleeding but is on Eliquis for atrial fibrillation    Review of Systems   Constitutional:  Negative for chills and fever.   HENT:  Negative for congestion.    Eyes:  Negative for blurred vision and photophobia.   Respiratory:  Negative for cough, shortness of breath and wheezing.    Cardiovascular:  Negative for chest pain.   Gastrointestinal:  Positive for blood in stool. Negative for abdominal pain and vomiting.        Positive for rectal pain   Genitourinary:  Negative for dysuria and frequency.   Musculoskeletal:  Negative for neck pain.   Skin:  Negative for rash.   Neurological:  Positive for weakness. Negative for dizziness, focal weakness and seizures.   Psychiatric/Behavioral:  Negative for depression and hallucinations.        Review of patient's allergies indicates:  No Known Allergies     No current facility-administered medications on file prior to encounter.     Current Outpatient Medications on File Prior to Encounter   Medication Sig Dispense Refill    digoxin (LANOXIN) 125 mcg tablet Take 1 tablet (0.125 mg total) by mouth once daily. 90 tablet 1     "ELIQUIS 2.5 mg Tab Take 1 tablet (2.5 mg total) by mouth 2 (two) times a day. 180 tablet 3    felodipine (PLENDIL) 5 MG 24 hr tablet Take 1 tablet (5 mg total) by mouth 2 (two) times daily. 180 tablet 3    losartan (COZAAR) 100 MG tablet Take 1 tablet (100 mg total) by mouth once daily. 90 tablet 2    metoprolol succinate (TOPROL-XL) 25 MG 24 hr tablet Take 1 tablet (25 mg total) by mouth once daily. 90 tablet 3    senna-docusate 8.6-50 mg (SENNA WITH DOCUSATE SODIUM) 8.6-50 mg per tablet Take 1 tablet by mouth once daily.      VIT C/VIT E/LUTEIN/MIN/OMEGA-3 (OCUVITE ORAL) Take by mouth.      [DISCONTINUED] nitrofurantoin, macrocrystal-monohydrate, (MACROBID) 100 MG capsule Take 1 capsule (100 mg total) by mouth 2 (two) times daily. 20 capsule 0    [DISCONTINUED] traZODone (DESYREL) 50 MG tablet Take 1 tablet (50 mg total) by mouth every evening. 30 tablet 2        Past Medical History:  As per HPI and below:  Past Medical History:   Diagnosis Date    Atrial fibrillation     Hypertension     MVP (mitral valve prolapse)     Skin cancer      Past Surgical History:   Procedure Laterality Date    ANKLE FRACTURE SURGERY      HYSTERECTOMY      MASTECTOMY, RADICAL Left     partial mastecomy Right     twisted bowl          Tobacco Use: Low Risk  (10/6/2024)    Patient History     Smoking Tobacco Use: Never     Smokeless Tobacco Use: Never     Passive Exposure: Not on file      reports that she has never smoked. She has never used smokeless tobacco. She reports current alcohol use. She reports that she does not use drugs.  Family History   Problem Relation Name Age of Onset    Glaucoma Neg Hx      Macular degeneration Neg Hx      Retinal detachment Neg Hx         Physical Exam:    BP: (!) 141/78  Pulse: 72  Temp: 97.7 °F (36.5 °C)  Resp: 16  Height: 5' 4" (162.6 cm)  Weight: 52.2 kg (115 lb)  BMI (Calculated): 19.7  SpO2: 98 %  pulmonary embolism     Physical Exam  Constitutional:       Appearance: Normal appearance. "   HENT:      Head: Normocephalic and atraumatic.      Nose: Nose normal.      Mouth/Throat:      Mouth: Mucous membranes are dry.      Pharynx: Oropharynx is clear.   Eyes:      Extraocular Movements: Extraocular movements intact.      Pupils: Pupils are equal, round, and reactive to light.   Cardiovascular:      Pulses: Normal pulses.      Heart sounds: Normal heart sounds.      Comments: Irregularly irregular rhythm with a rate in the 80s  Pulmonary:      Effort: Pulmonary effort is normal.      Breath sounds: Normal breath sounds.   Abdominal:      General: Abdomen is flat. Bowel sounds are normal.      Palpations: Abdomen is soft.      Comments: Tenderness in the rectal vault.  There is excoriation of the perianal area.  There is no gross blood.  Stool is dark brown.  It is guaiac positive   Musculoskeletal:         General: No swelling or tenderness.      Cervical back: Normal range of motion and neck supple.   Skin:     General: Skin is warm and dry.   Neurological:      General: No focal deficit present.      Mental Status: She is alert.                     Medications   sodium chloride 0.9% flush 2 mL (has no administration in time range)   melatonin tablet 6 mg (has no administration in time range)   ondansetron injection 4 mg (has no administration in time range)   acetaminophen tablet 650 mg (has no administration in time range)   aluminum-magnesium hydroxide-simethicone 200-200-20 mg/5 mL suspension 30 mL (has no administration in time range)   naloxone 0.4 mg/mL injection 0.02 mg (has no administration in time range)   potassium bicarbonate disintegrating tablet 50 mEq (has no administration in time range)   potassium bicarbonate disintegrating tablet 35 mEq (has no administration in time range)   potassium bicarbonate disintegrating tablet 60 mEq (has no administration in time range)   magnesium oxide tablet 800 mg (has no administration in time range)   magnesium oxide tablet 800 mg (has no  administration in time range)   potassium, sodium phosphates 280-160-250 mg packet 2 packet (has no administration in time range)   potassium, sodium phosphates 280-160-250 mg packet 2 packet (has no administration in time range)   potassium, sodium phosphates 280-160-250 mg packet 2 packet (has no administration in time range)   glucose chewable tablet 16 g (has no administration in time range)   glucose chewable tablet 24 g (has no administration in time range)   dextrose 50% injection 12.5 g (has no administration in time range)   dextrose 50% injection 25 g (has no administration in time range)   glucagon (human recombinant) injection 1 mg (has no administration in time range)   acetaminophen tablet 650 mg (has no administration in time range)   HYDROcodone-acetaminophen 5-325 mg per tablet 1 tablet (has no administration in time range)   digoxin tablet 0.125 mg (has no administration in time range)   amLODIPine tablet 5 mg (has no administration in time range)   losartan tablet 100 mg (has no administration in time range)   metoprolol succinate (TOPROL-XL) 24 hr tablet 25 mg (25 mg Oral Not Given 10/6/24 1530)   senna-docusate 8.6-50 mg per tablet 1 tablet (has no administration in time range)   multivitamin tablet (has no administration in time range)   pantoprazole injection 40 mg (has no administration in time range)   piperacillin-tazobactam 4.5 g in dextrose 5 % 100 mL IVPB (ready to mix) (4.5 g Intravenous New Bag 10/6/24 1711)   morphine injection 2 mg (2 mg Intravenous Given 10/6/24 1104)   iohexoL (OMNIPAQUE 350) injection 100 mL (100 mLs Intravenous Given 10/6/24 1454)       Labs:  Lab results were reviewed and independently interpreted by me, the emergency care provider.  Recent Results (from the past 24 hours)   CBC auto differential    Collection Time: 10/06/24 10:57 AM   Result Value Ref Range    WBC 9.66 3.90 - 12.70 K/uL    RBC 4.59 4.00 - 5.40 M/uL    Hemoglobin 13.4 12.0 - 16.0 g/dL     Hematocrit 41.0 37.0 - 48.5 %    MCV 89 82 - 98 fL    MCH 29.2 27.0 - 31.0 pg    MCHC 32.7 32.0 - 36.0 g/dL    RDW 14.7 (H) 11.5 - 14.5 %    Platelets 282 150 - 450 K/uL    MPV 9.7 9.2 - 12.9 fL    Immature Granulocytes 0.3 0.0 - 0.5 %    Gran # (ANC) 7.8 (H) 1.8 - 7.7 K/uL    Immature Grans (Abs) 0.03 0.00 - 0.04 K/uL    Lymph # 1.0 1.0 - 4.8 K/uL    Mono # 0.7 0.3 - 1.0 K/uL    Eos # 0.1 0.0 - 0.5 K/uL    Baso # 0.04 0.00 - 0.20 K/uL    nRBC 0 0 /100 WBC    Gran % 80.8 (H) 38.0 - 73.0 %    Lymph % 10.2 (L) 18.0 - 48.0 %    Mono % 7.7 4.0 - 15.0 %    Eosinophil % 0.6 0.0 - 8.0 %    Basophil % 0.4 0.0 - 1.9 %    Differential Method Automated    Comprehensive metabolic panel    Collection Time: 10/06/24 10:57 AM   Result Value Ref Range    Sodium 133 (L) 136 - 145 mmol/L    Potassium 4.2 3.5 - 5.1 mmol/L    Chloride 97 95 - 110 mmol/L    CO2 26 23 - 29 mmol/L    Glucose 112 (H) 70 - 110 mg/dL    BUN 15 10 - 30 mg/dL    Creatinine 0.7 0.5 - 1.4 mg/dL    Calcium 9.3 8.7 - 10.5 mg/dL    Total Protein 7.2 6.0 - 8.4 g/dL    Albumin 4.2 3.5 - 5.2 g/dL    Total Bilirubin 0.8 0.1 - 1.0 mg/dL    Alkaline Phosphatase 98 55 - 135 U/L    AST 21 10 - 40 U/L    ALT 19 10 - 44 U/L    eGFR >60.0 >60 mL/min/1.73 m^2    Anion Gap 10 8 - 16 mmol/L   Protime-INR    Collection Time: 10/06/24 10:57 AM   Result Value Ref Range    Prothrombin Time 11.5 9.0 - 12.5 sec    INR 1.0 0.8 - 1.2   APTT    Collection Time: 10/06/24 10:57 AM   Result Value Ref Range    aPTT 31.1 21.0 - 32.0 sec   Troponin I High Sensitivity    Collection Time: 10/06/24 10:57 AM   Result Value Ref Range    Troponin I High Sensitivity 20.6 (H) 0.0 - 14.9 pg/mL   Type & Screen    Collection Time: 10/06/24 11:00 AM   Result Value Ref Range    Group & Rh O NEG     Indirect Micaela NEG     Specimen Outdate 10/09/2024 23:59    Occult blood x 1, stool    Collection Time: 10/06/24  1:40 PM    Specimen: Stool   Result Value Ref Range    Occult Blood Positive (A) Negative            Medical Decision Making:   This is a 89 y.o. female generalized weakness and reported rectal bleeding    Orders included   Orders Placed This Encounter   Procedures    CT Abdomen Pelvis With IV Contrast NO Oral Contrast    CBC auto differential    Comprehensive metabolic panel    Protime-INR    APTT    Troponin I High Sensitivity    Urinalysis, Reflex to Urine Culture Urine, Clean Catch    Occult blood x 1, stool    Troponin I High Sensitivity    Troponin I High Sensitivity    Comprehensive Metabolic Panel (CMP)    Magnesium    CBC with Automated Differential    CBC auto differential    Diet NPO    Vital signs    Bladder scan    Notify Physician    Place sequential compression device    Recheck Blood Glucose:    Cardiac Monitoring - Adult    Orthostatic blood pressure With vital signs    Check Type and Screen complete    Check consent to blood transfusion    DNR (Do Not Resuscitate)    Inpatient consult to Gastroenterology    EKG 12-lead    Type & Screen    Insert Saline lock IV    Saline lock IV    Large Bore IV Access    Place in Observation    Fall precautions       Labs were reviewed and were significant for a CBC has a normal white count.  H and H was 13 and 41 respectively.  CMP has a sodium 133.  Glucose of 112.  Initial high sensitivity is 20.  PT PTT were unremarkable.    Imaging:  I reviewed the CT of the abdomen images radiologist on-call who reported:  CT Abdomen Pelvis With IV Contrast NO Oral Contrast   Final Result      1. Diverticulosis.   2. Mild to moderate rectal distention with stool.   3. Perirectal and presacral fat stranding/edema is diffuse and moderate.  Although nonspecific, this can be seen with proctitis or stercoral colitis in the appropriate clinical setting.   4. Coronary artery calcifications.   5. Bilateral pleural effusions.   6. 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         Electronically signed by: José Rosen    Date:    10/06/2024   Time:    15:08          Differential diagnosis includes hemorrhoid bleeding, diverticulosis, colitis, malignancy, infectious diarrhea, intra-abdominal infections, upper GI bleeding., ACS.  I do not think ACS is the etiology of her generalized weakness.  She is having no chest pain.  She is not short of breath.  She is pending a repeat troponin.  Infectious etiologies I think less likely as well given that she is afebrile and has no leukocytosis.  The patient presents with generalized weakness and reported rectal bleeding.  She does have guaiac-positive stools.  She has remained hemodynamically stable with unremarkable H&H.  She has significant pain in the rectal area so CT scan abdomen and pelvis has been obtained.  Type and screen has been done.  She is anticoagulated with Eliquis.  She may require serial H and H.  I discussed with hospitalist for admission.       7110  Re-evaluation:  Patient was re-evaluated by me.  She has remained hemodynamically stable.  She is awaiting hospital admission  I discussed the workup results and the plan of care.  Patient verbalized understanding.  Any concerns and/or questions were addressed.      Comorbidity impacting this encounter includes:  AFib on Eliquis    Exacerbation and/or Progression of Chronic illness:  None    Social determinants of health that impact care:  Lives in nursing home     Admission considered but ruled out    Impression  1 lower GI bleeding  2. weakness    Disposition:  Admit to Internal Medicine      Current Discharge Medication List            Please note that this document was completed using voice recognition software and may contain syntax and/or typographical errors       Evan Brush MD  10/06/24 2042

## 2024-10-06 NOTE — SUBJECTIVE & OBJECTIVE
Past Medical History:   Diagnosis Date    Atrial fibrillation     Hypertension     MVP (mitral valve prolapse)     Skin cancer        Past Surgical History:   Procedure Laterality Date    ANKLE FRACTURE SURGERY      HYSTERECTOMY      MASTECTOMY, RADICAL Left     partial mastecomy Right     twisted bowl         Review of patient's allergies indicates:  No Known Allergies    No current facility-administered medications on file prior to encounter.     Current Outpatient Medications on File Prior to Encounter   Medication Sig    digoxin (LANOXIN) 125 mcg tablet Take 1 tablet (0.125 mg total) by mouth once daily.    ELIQUIS 2.5 mg Tab Take 1 tablet (2.5 mg total) by mouth 2 (two) times a day.    felodipine (PLENDIL) 5 MG 24 hr tablet Take 1 tablet (5 mg total) by mouth 2 (two) times daily.    losartan (COZAAR) 100 MG tablet Take 1 tablet (100 mg total) by mouth once daily.    metoprolol succinate (TOPROL-XL) 25 MG 24 hr tablet Take 1 tablet (25 mg total) by mouth once daily.    senna-docusate 8.6-50 mg (SENNA WITH DOCUSATE SODIUM) 8.6-50 mg per tablet Take 1 tablet by mouth once daily.    VIT C/VIT E/LUTEIN/MIN/OMEGA-3 (OCUVITE ORAL) Take by mouth.    [DISCONTINUED] nitrofurantoin, macrocrystal-monohydrate, (MACROBID) 100 MG capsule Take 1 capsule (100 mg total) by mouth 2 (two) times daily.    [DISCONTINUED] traZODone (DESYREL) 50 MG tablet Take 1 tablet (50 mg total) by mouth every evening.     Family History    None       Tobacco Use    Smoking status: Never    Smokeless tobacco: Never   Substance and Sexual Activity    Alcohol use: Yes     Comment: 1 [place] daily    Drug use: No    Sexual activity: Not on file     Review of Systems   Constitutional:  Positive for fatigue.   Gastrointestinal:  Positive for blood in stool and constipation.     Objective:     Vital Signs (Most Recent):  Temp: 98.3 °F (36.8 °C) (10/06/24 1620)  Pulse: 86 (10/06/24 1620)  Resp: 18 (10/06/24 1620)  BP: (!) 148/70 (10/06/24 1620)  SpO2:  96 % (10/06/24 1620) Vital Signs (24h Range):  Temp:  [97.7 °F (36.5 °C)-98.3 °F (36.8 °C)] 98.3 °F (36.8 °C)  Pulse:  [71-86] 86  Resp:  [13-19] 18  SpO2:  [95 %-98 %] 96 %  BP: (140-163)/(70-93) 148/70     Weight: 52.2 kg (115 lb)  Body mass index is 19.74 kg/m².     Physical Exam  Vitals reviewed.   Constitutional:       General: She is not in acute distress.     Appearance: Normal appearance. She is not toxic-appearing.   HENT:      Head: Normocephalic and atraumatic.      Mouth/Throat:      Mouth: Mucous membranes are moist.      Pharynx: Oropharynx is clear.   Eyes:      Extraocular Movements: Extraocular movements intact.      Pupils: Pupils are equal, round, and reactive to light.   Cardiovascular:      Rate and Rhythm: Normal rate. Rhythm irregular.      Pulses: Normal pulses.      Heart sounds: Normal heart sounds.   Pulmonary:      Effort: Pulmonary effort is normal.      Breath sounds: Normal breath sounds.   Abdominal:      General: Bowel sounds are normal. There is no distension.      Palpations: Abdomen is soft.      Tenderness: There is abdominal tenderness.   Musculoskeletal:         General: No swelling. Normal range of motion.      Cervical back: Normal range of motion and neck supple.   Skin:     General: Skin is warm and dry.      Capillary Refill: Capillary refill takes less than 2 seconds.   Neurological:      General: No focal deficit present.      Mental Status: She is alert and oriented to person, place, and time. Mental status is at baseline.   Psychiatric:         Mood and Affect: Mood normal.         Behavior: Behavior normal.         Judgment: Judgment normal.              CRANIAL NERVES     CN III, IV, VI   Pupils are equal, round, and reactive to light.       Significant Labs: All pertinent labs within the past 24 hours have been reviewed.  Recent Lab Results         10/06/24  1340   10/06/24  1100   10/06/24  1057        Albumin     4.2       ALP     98       ALT     19       Anion  Gap     10       PTT     31.1  Comment: Refer to local heparin nomogram for intensity/dose specific   therapeutic   range.         AST     21       Baso #     0.04       Basophil %     0.4       BILIRUBIN TOTAL     0.8  Comment: For infants and newborns, interpretation of results should be based  on gestational age, weight and in agreement with clinical  observations.    Premature Infant recommended reference ranges:  Up to 24 hours.............<8.0 mg/dL  Up to 48 hours............<12.0 mg/dL  3-5 days..................<15.0 mg/dL  6-29 days.................<15.0 mg/dL         BUN     15       Calcium     9.3       Chloride     97       CO2     26       Creatinine     0.7       Differential Method     Automated       eGFR     >60.0       Eos #     0.1       Eos %     0.6       Glucose     112       Gran # (ANC)     7.8       Gran %     80.8       Group & Rh   O NEG         Hematocrit     41.0       Hemoglobin     13.4       Immature Grans (Abs)     0.03  Comment: Mild elevation in immature granulocytes is non specific and   can be seen in a variety of conditions including stress response,   acute inflammation, trauma and pregnancy. Correlation with other   laboratory and clinical findings is essential.         Immature Granulocytes     0.3       INDIRECT ZENAIDA   NEG         INR     1.0  Comment: Coumadin Therapy:  2.0 - 3.0 for INR for all indicators except mechanical heart valves  and antiphospholipid syndromes which should use 2.5 - 3.5.         Lymph #     1.0       Lymph %     10.2       MCH     29.2       MCHC     32.7       MCV     89       Mono #     0.7       Mono %     7.7       MPV     9.7       nRBC     0       Occult Blood Positive           Platelet Count     282       Potassium     4.2       PROTEIN TOTAL     7.2       PT     11.5       RBC     4.59       RDW     14.7       Sodium     133       Specimen Outdate   10/09/2024 23:59         Troponin I High Sensitivity     20.6  Comment: Troponin results  differ between methods. Do not use   results between Troponin methods interchangeably.    Alkaline Phospatase levels above 400 U/L may   cause false positive results.    Access hsTnI should not be used for patients taking   Asfotase evan (Strensiq).         WBC     9.66               Significant Imaging: I have reviewed all pertinent imaging results/findings within the past 24 hours.    CT Abdomen Pelvis With IV Contrast NO Oral Contrast  Narrative: EXAMINATION:  CT ABDOMEN PELVIS WITH IV CONTRAST    CLINICAL HISTORY:  Abdominal pain, acute, nonlocalized;pain in rectum;    TECHNIQUE:  CT abdomen and pelvis with 100 mL Omnipaque 350.    COMPARISON:  None    FINDINGS:  CT ABDOMEN:    Heart is enlarged with coronary artery calcification, partially visualized.  Tiny right and small left pleural effusions dependently layer.  Dependent left lower lobe opacity is minimal suggesting atelectasis.    28 mm cyst lies in the left hepatic lobe with other smaller hypodensities likely representing cysts also noted in juxta hepatic parenchyma and in the right hepatic lobe.  Gallbladder, pancreas, spleen, and adrenals are normal.  Bilateral kidneys are normal, without hydronephrosis or urolithiasis.  Prominent narrowing is suggested at celiac axis origin, with branches being widely patent.  SMA and BAKARI are patent.  Normal caliber abdominal aorta contains mild atherosclerotic plaque.    Small intestines are unremarkable.  Appendix not identified but no secondary signs of appendicitis are evident.  Surgical clips are noted throughout the right abdomen.  No free intraperitoneal gas.    Lumbar disc degeneration and facet joint osteoarthrosis.    CT PELVIS:    Mild to moderate rectal distention with stool is evident.  Perirectal and presacral fat stranding/edema is diffuse and moderate.  Uterus has been removed.  No adnexal mass.  Bladder is normal.  Colonic diverticula noted about the descending and rectosigmoid colon.    Lipomatous  lesion partially visualized in the anterior left thigh compartment measuring 4.3 x 5 cm, incompletely visualized.  No acute osseous abnormality.  Impression: 1. Diverticulosis.  2. Mild to moderate rectal distention with stool.  3. Perirectal and presacral fat stranding/edema is diffuse and moderate.  Although nonspecific, this can be seen with proctitis or stercoral colitis in the appropriate clinical setting.  4. Coronary artery calcifications.  5. Bilateral pleural effusions.  6. 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    Electronically signed by: José Rosen  Date:    10/06/2024  Time:    15:08

## 2024-10-06 NOTE — HPI
89-year-old female with pMHx of longstanding persistent atrial fibrillation on Eliquis and hypertension who presented to the ED via EMS from [place] with reports of generalized weakness.  Patient and daughter reports that 2 days ago the patient fell and she has become increasingly weak in her right leg and hip are sore.  She does report that she has been constipated for the last 3 days and that today when she finally had a BM she had blood in her stool.  On evaluation in the ED patient with dark brown stool in the rectal vault occult stool positive.  Sodium 133, glucose 112, hemoglobin 13.4, hematocrit 41.  Initial troponin 20.6.  A CT scan of the abdomen and pelvis was performed which revealed mild-to-moderate rectal distention with stool.  Perirectal and presacral fat stranding/edema is diffuse and moderate that can be seen in proctitis or stercoral colitis.  Bilateral pleural effusions.  Coronary artery calcifications. 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  Patient will be admitted to the hospital and started on IV Zosyn GI consult placed and Eliquis held.

## 2024-10-06 NOTE — H&P
[facility]  History & Physical    Patient Name: Rosalva Toney  MRN: [id number]  Patient Class: OP- Observation  Admission Date: 10/6/2024  Attending Physician: Nina Garrett MD   Primary Care Provider: Jamie De Luna MD         Patient information was obtained from patient, relative(s), and ER records.     Subjective:     Principal Problem:Stercoral colitis    Chief Complaint:   Chief Complaint   Patient presents with    Weakness     Increased weakness x1 month. Pt noted blood in toilet after bowel movement.         HPI: 89-year-old female with pMHx of longstanding persistent atrial fibrillation on Eliquis and hypertension who presented to the ED via EMS from [place] with reports of generalized weakness.  Patient and daughter reports that 2 days ago the patient fell and she has become increasingly weak in her right leg and hip are sore.  She does report that she has been constipated for the last 3 days and that today when she finally had a BM she had blood in her stool.  On evaluation in the ED patient with dark brown stool in the rectal vault occult stool positive.  Sodium 133, glucose 112, hemoglobin 13.4, hematocrit 41.  Initial troponin 20.6.  A CT scan of the abdomen and pelvis was performed which revealed mild-to-moderate rectal distention with stool.  Perirectal and presacral fat stranding/edema is diffuse and moderate that can be seen in proctitis or stercoral colitis.  Bilateral pleural effusions.  Coronary artery calcifications. 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  Patient will be admitted to the hospital and started on IV Zosyn GI consult placed and Eliquis held.       Past Medical History:   Diagnosis Date    Atrial fibrillation     Hypertension     MVP (mitral valve prolapse)     Skin cancer        Past Surgical History:   Procedure Laterality Date    ANKLE FRACTURE SURGERY      HYSTERECTOMY      MASTECTOMY, RADICAL  Left     partial mastecomy Right     twisted bowl         Review of patient's allergies indicates:  No Known Allergies    No current facility-administered medications on file prior to encounter.     Current Outpatient Medications on File Prior to Encounter   Medication Sig    digoxin (LANOXIN) 125 mcg tablet Take 1 tablet (0.125 mg total) by mouth once daily.    ELIQUIS 2.5 mg Tab Take 1 tablet (2.5 mg total) by mouth 2 (two) times a day.    felodipine (PLENDIL) 5 MG 24 hr tablet Take 1 tablet (5 mg total) by mouth 2 (two) times daily.    losartan (COZAAR) 100 MG tablet Take 1 tablet (100 mg total) by mouth once daily.    metoprolol succinate (TOPROL-XL) 25 MG 24 hr tablet Take 1 tablet (25 mg total) by mouth once daily.    senna-docusate 8.6-50 mg (SENNA WITH DOCUSATE SODIUM) 8.6-50 mg per tablet Take 1 tablet by mouth once daily.    VIT C/VIT E/LUTEIN/MIN/OMEGA-3 (OCUVITE ORAL) Take by mouth.    [DISCONTINUED] nitrofurantoin, macrocrystal-monohydrate, (MACROBID) 100 MG capsule Take 1 capsule (100 mg total) by mouth 2 (two) times daily.    [DISCONTINUED] traZODone (DESYREL) 50 MG tablet Take 1 tablet (50 mg total) by mouth every evening.     Family History    None       Tobacco Use    Smoking status: Never    Smokeless tobacco: Never   Substance and Sexual Activity    Alcohol use: Yes     Comment: 1 [place] daily    Drug use: No    Sexual activity: Not on file     Review of Systems   Constitutional:  Positive for fatigue.   Gastrointestinal:  Positive for blood in stool and constipation.     Objective:     Vital Signs (Most Recent):  Temp: 98.3 °F (36.8 °C) (10/06/24 1620)  Pulse: 86 (10/06/24 1620)  Resp: 18 (10/06/24 1620)  BP: (!) 148/70 (10/06/24 1620)  SpO2: 96 % (10/06/24 1620) Vital Signs (24h Range):  Temp:  [97.7 °F (36.5 °C)-98.3 °F (36.8 °C)] 98.3 °F (36.8 °C)  Pulse:  [71-86] 86  Resp:  [13-19] 18  SpO2:  [95 %-98 %] 96 %  BP: (140-163)/(70-93) 148/70     Weight: 52.2 kg (115 lb)  Body mass index is  19.74 kg/m².     Physical Exam  Vitals reviewed.   Constitutional:       General: She is not in acute distress.     Appearance: Normal appearance. She is not toxic-appearing.   HENT:      Head: Normocephalic and atraumatic.      Mouth/Throat:      Mouth: Mucous membranes are moist.      Pharynx: Oropharynx is clear.   Eyes:      Extraocular Movements: Extraocular movements intact.      Pupils: Pupils are equal, round, and reactive to light.   Cardiovascular:      Rate and Rhythm: Normal rate. Rhythm irregular.      Pulses: Normal pulses.      Heart sounds: Normal heart sounds.   Pulmonary:      Effort: Pulmonary effort is normal.      Breath sounds: Normal breath sounds.   Abdominal:      General: Bowel sounds are normal. There is no distension.      Palpations: Abdomen is soft.      Tenderness: There is abdominal tenderness.   Musculoskeletal:         General: No swelling. Normal range of motion.      Cervical back: Normal range of motion and neck supple.   Skin:     General: Skin is warm and dry.      Capillary Refill: Capillary refill takes less than 2 seconds.   Neurological:      General: No focal deficit present.      Mental Status: She is alert and oriented to person, place, and time. Mental status is at baseline.   Psychiatric:         Mood and Affect: Mood normal.         Behavior: Behavior normal.         Judgment: Judgment normal.              CRANIAL NERVES     CN III, IV, VI   Pupils are equal, round, and reactive to light.       Significant Labs: All pertinent labs within the past 24 hours have been reviewed.  Recent Lab Results         10/06/24  1340   10/06/24  1100   10/06/24  1057        Albumin     4.2       ALP     98       ALT     19       Anion Gap     10       PTT     31.1  Comment: Refer to local heparin nomogram for intensity/dose specific   therapeutic   range.         AST     21       Baso #     0.04       Basophil %     0.4       BILIRUBIN TOTAL     0.8  Comment: For infants and newborns,  interpretation of results should be based  on gestational age, weight and in agreement with clinical  observations.    Premature Infant recommended reference ranges:  Up to 24 hours.............<8.0 mg/dL  Up to 48 hours............<12.0 mg/dL  3-5 days..................<15.0 mg/dL  6-29 days.................<15.0 mg/dL         BUN     15       Calcium     9.3       Chloride     97       CO2     26       Creatinine     0.7       Differential Method     Automated       eGFR     >60.0       Eos #     0.1       Eos %     0.6       Glucose     112       Gran # (ANC)     7.8       Gran %     80.8       Group & Rh   O NEG         Hematocrit     41.0       Hemoglobin     13.4       Immature Grans (Abs)     0.03  Comment: Mild elevation in immature granulocytes is non specific and   can be seen in a variety of conditions including stress response,   acute inflammation, trauma and pregnancy. Correlation with other   laboratory and clinical findings is essential.         Immature Granulocytes     0.3       INDIRECT ZENAIDA   NEG         INR     1.0  Comment: Coumadin Therapy:  2.0 - 3.0 for INR for all indicators except mechanical heart valves  and antiphospholipid syndromes which should use 2.5 - 3.5.         Lymph #     1.0       Lymph %     10.2       MCH     29.2       MCHC     32.7       MCV     89       Mono #     0.7       Mono %     7.7       MPV     9.7       nRBC     0       Occult Blood Positive           Platelet Count     282       Potassium     4.2       PROTEIN TOTAL     7.2       PT     11.5       RBC     4.59       RDW     14.7       Sodium     133       Specimen Outdate   10/09/2024 23:59         Troponin I High Sensitivity     20.6  Comment: Troponin results differ between methods. Do not use   results between Troponin methods interchangeably.    Alkaline Phospatase levels above 400 U/L may   cause false positive results.    Access hsTnI should not be used for patients taking   Asfotase evan (Strensiq).          WBC     9.66               Significant Imaging: I have reviewed all pertinent imaging results/findings within the past 24 hours.    CT Abdomen Pelvis With IV Contrast NO Oral Contrast  Narrative: EXAMINATION:  CT ABDOMEN PELVIS WITH IV CONTRAST    CLINICAL HISTORY:  Abdominal pain, acute, nonlocalized;pain in rectum;    TECHNIQUE:  CT abdomen and pelvis with 100 mL Omnipaque 350.    COMPARISON:  None    FINDINGS:  CT ABDOMEN:    Heart is enlarged with coronary artery calcification, partially visualized.  Tiny right and small left pleural effusions dependently layer.  Dependent left lower lobe opacity is minimal suggesting atelectasis.    28 mm cyst lies in the left hepatic lobe with other smaller hypodensities likely representing cysts also noted in juxta hepatic parenchyma and in the right hepatic lobe.  Gallbladder, pancreas, spleen, and adrenals are normal.  Bilateral kidneys are normal, without hydronephrosis or urolithiasis.  Prominent narrowing is suggested at celiac axis origin, with branches being widely patent.  SMA and BAKARI are patent.  Normal caliber abdominal aorta contains mild atherosclerotic plaque.    Small intestines are unremarkable.  Appendix not identified but no secondary signs of appendicitis are evident.  Surgical clips are noted throughout the right abdomen.  No free intraperitoneal gas.    Lumbar disc degeneration and facet joint osteoarthrosis.    CT PELVIS:    Mild to moderate rectal distention with stool is evident.  Perirectal and presacral fat stranding/edema is diffuse and moderate.  Uterus has been removed.  No adnexal mass.  Bladder is normal.  Colonic diverticula noted about the descending and rectosigmoid colon.    Lipomatous lesion partially visualized in the anterior left thigh compartment measuring 4.3 x 5 cm, incompletely visualized.  No acute osseous abnormality.  Impression: 1. Diverticulosis.  2. Mild to moderate rectal distention with stool.  3. Perirectal and presacral  fat stranding/edema is diffuse and moderate.  Although nonspecific, this can be seen with proctitis or stercoral colitis in the appropriate clinical setting.  4. Coronary artery calcifications.  5. Bilateral pleural effusions.  6. 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    Electronically signed by: José Rosen  Date:    10/06/2024  Time:    15:08      Assessment/Plan:     * Stercoral colitis  IV Zosyn  Consult GI   Gastrointestinal PCR  Continued Senokot 8.6/50        Lower GI bleed  Likely related to stercoral colitis and/or proctitis  Consult GI  Patient's hemorrhage is due to gastrointestinal bleed, patient does have a propensity for bleeding due to a medication, the medication is Eliquis.. Patients most recent Hgb, Hct, platelets, and INR are listed below.  Recent Labs     10/06/24  1057   HGB 13.4   HCT 41.0      INR 1.0     Plan  - Will trend hemoglobin/hematocrit Every 8 hours  - Will monitor and correct any coagulation defects  - Will transfuse if Hgb is <7g/dl (<8g/dl in cases of active ACS) or if patient has rapid bleeding leading to hemodynamic instability    Demand ischemia  Initial troponin 20.6  Trend troponins      Longstanding persistent atrial fibrillation  Patient has pong standing persistent (>12 months) atrial fibrillation. Patient is currently in atrial fibrillation. [id number] Score: 3. The patients heart rate in the last 24 hours is as follows:  Pulse  Min: 71  Max: 86     Antiarrhythmics  metoprolol succinate (TOPROL-XL) 24 hr tablet 25 mg, Daily, Oral    Anticoagulants       Plan  - Replete lytes with a goal of K>4, Mg >2  - Patient is anticoagulated, see medications listed above.  - Patient's afib is currently controlled  - Hold Eliquis for now        Essential hypertension  Patients blood pressure range in the last 24 hours was: BP  Min: 140/71  Max: 163/85.The patient's inpatient anti-hypertensive regimen is listed below:  Current  Antihypertensives  amLODIPine tablet 5 mg, Daily, Oral  losartan tablet 100 mg, Daily, Oral  metoprolol succinate (TOPROL-XL) 24 hr tablet 25 mg, Daily, Oral    Plan  - BP is controlled, no changes needed to their regimen        VTE Risk Mitigation (From admission, onward)           Ordered     IP VTE HIGH RISK PATIENT  Once         10/06/24 1426     Place sequential compression device  Until discontinued         10/06/24 1426     Reason for No Pharmacological VTE Prophylaxis  Once        Question:  Reasons:  Answer:  Active Bleeding    10/06/24 1426                         On 10/06/2024, patient should be placed in hospital observation services under my care in collaboration with Gerry.           Lindsay Nolasco NP  Department of Hospital Medicine  [facility]    --------------------------------------------------------------------------------------  DISCLAIMER: This note was prepared with [employer] Direct voice recognition transcription software. Garbled syntax, mangled pronouns, and other bizarre constructions may be attributed to that software system

## 2024-10-06 NOTE — ASSESSMENT & PLAN NOTE
Likely related to stercoral colitis and/or proctitis  Consult GI  Patient's hemorrhage is due to gastrointestinal bleed, patient does have a propensity for bleeding due to a medication, the medication is Eliquis.. Patients most recent Hgb, Hct, platelets, and INR are listed below.  Recent Labs     10/06/24  1057   HGB 13.4   HCT 41.0      INR 1.0     Plan  - Will trend hemoglobin/hematocrit Every 8 hours  - Will monitor and correct any coagulation defects  - Will transfuse if Hgb is <7g/dl (<8g/dl in cases of active ACS) or if patient has rapid bleeding leading to hemodynamic instability

## 2024-10-06 NOTE — ASSESSMENT & PLAN NOTE
Patients blood pressure range in the last 24 hours was: BP  Min: 140/71  Max: 163/85.The patient's inpatient anti-hypertensive regimen is listed below:  Current Antihypertensives  amLODIPine tablet 5 mg, Daily, Oral  losartan tablet 100 mg, Daily, Oral  metoprolol succinate (TOPROL-XL) 24 hr tablet 25 mg, Daily, Oral    Plan  - BP is controlled, no changes needed to their regimen

## 2024-10-06 NOTE — ASSESSMENT & PLAN NOTE
Patient has pong standing persistent (>12 months) atrial fibrillation. Patient is currently in atrial fibrillation. [id number] Score: 3. The patients heart rate in the last 24 hours is as follows:  Pulse  Min: 71  Max: 86     Antiarrhythmics  metoprolol succinate (TOPROL-XL) 24 hr tablet 25 mg, Daily, Oral    Anticoagulants       Plan  - Replete lytes with a goal of K>4, Mg >2  - Patient is anticoagulated, see medications listed above.  - Patient's afib is currently controlled  - Hold Eliquis for now

## 2024-10-07 PROBLEM — K52.89 STERCORAL COLITIS: Status: RESOLVED | Noted: 2024-10-06 | Resolved: 2024-10-07

## 2024-10-07 PROBLEM — I24.89 DEMAND ISCHEMIA: Status: RESOLVED | Noted: 2024-10-06 | Resolved: 2024-10-07

## 2024-10-07 LAB — POCT GLUCOSE: 183 MG/DL (ref 70–110)

## 2024-10-07 PROCEDURE — 25000003 PHARM REV CODE 250: Performed by: INTERNAL MEDICINE

## 2024-10-07 PROCEDURE — 97166 OT EVAL MOD COMPLEX 45 MIN: CPT

## 2024-10-07 PROCEDURE — 97161 PT EVAL LOW COMPLEX 20 MIN: CPT

## 2024-10-07 PROCEDURE — 25000003 PHARM REV CODE 250

## 2024-10-07 PROCEDURE — 96376 TX/PRO/DX INJ SAME DRUG ADON: CPT

## 2024-10-07 PROCEDURE — 25000003 PHARM REV CODE 250: Performed by: NURSE PRACTITIONER

## 2024-10-07 PROCEDURE — 96361 HYDRATE IV INFUSION ADD-ON: CPT

## 2024-10-07 PROCEDURE — 97535 SELF CARE MNGMENT TRAINING: CPT

## 2024-10-07 PROCEDURE — 97162 PT EVAL MOD COMPLEX 30 MIN: CPT

## 2024-10-07 PROCEDURE — 96366 THER/PROPH/DIAG IV INF ADDON: CPT

## 2024-10-07 PROCEDURE — G0378 HOSPITAL OBSERVATION PER HR: HCPCS

## 2024-10-07 PROCEDURE — 63600175 PHARM REV CODE 636 W HCPCS

## 2024-10-07 PROCEDURE — 97530 THERAPEUTIC ACTIVITIES: CPT

## 2024-10-07 PROCEDURE — 63600175 PHARM REV CODE 636 W HCPCS: Performed by: NURSE PRACTITIONER

## 2024-10-07 RX ORDER — POLYETHYLENE GLYCOL 3350 17 G/17G
68 POWDER, FOR SOLUTION ORAL ONCE
Status: COMPLETED | OUTPATIENT
Start: 2024-10-07 | End: 2024-10-07

## 2024-10-07 RX ORDER — MORPHINE SULFATE 2 MG/ML
2 INJECTION, SOLUTION INTRAMUSCULAR; INTRAVENOUS ONCE
Status: COMPLETED | OUTPATIENT
Start: 2024-10-07 | End: 2024-10-07

## 2024-10-07 RX ORDER — HYDROCORTISONE 25 MG/G
CREAM TOPICAL 2 TIMES DAILY
Status: DISCONTINUED | OUTPATIENT
Start: 2024-10-07 | End: 2024-10-08 | Stop reason: HOSPADM

## 2024-10-07 RX ADMIN — MULTIVITAMIN TABLET 1 TABLET: TABLET at 10:10

## 2024-10-07 RX ADMIN — MORPHINE SULFATE 2 MG: 2 INJECTION, SOLUTION INTRAMUSCULAR; INTRAVENOUS at 10:10

## 2024-10-07 RX ADMIN — HYDROCORTISONE: 25 CREAM TOPICAL at 04:10

## 2024-10-07 RX ADMIN — SENNOSIDES AND DOCUSATE SODIUM 1 TABLET: 8.6; 5 TABLET ORAL at 10:10

## 2024-10-07 RX ADMIN — POLYETHYLENE GLYCOL 3350 68 G: 17 POWDER, FOR SOLUTION ORAL at 07:10

## 2024-10-07 RX ADMIN — PANTOPRAZOLE SODIUM 40 MG: 40 INJECTION, POWDER, LYOPHILIZED, FOR SOLUTION INTRAVENOUS at 08:10

## 2024-10-07 RX ADMIN — PIPERACILLIN SODIUM AND TAZOBACTAM SODIUM 4.5 G: 4; .5 INJECTION, POWDER, LYOPHILIZED, FOR SOLUTION INTRAVENOUS at 12:10

## 2024-10-07 RX ADMIN — METOPROLOL SUCCINATE 25 MG: 25 TABLET, FILM COATED, EXTENDED RELEASE ORAL at 10:10

## 2024-10-07 RX ADMIN — AMLODIPINE BESYLATE 5 MG: 5 TABLET ORAL at 10:10

## 2024-10-07 RX ADMIN — LOSARTAN POTASSIUM 100 MG: 50 TABLET, FILM COATED ORAL at 10:10

## 2024-10-07 RX ADMIN — SODIUM CHLORIDE 1000 ML: 9 INJECTION, SOLUTION INTRAVENOUS at 12:10

## 2024-10-07 RX ADMIN — PANTOPRAZOLE SODIUM 40 MG: 40 INJECTION, POWDER, LYOPHILIZED, FOR SOLUTION INTRAVENOUS at 10:10

## 2024-10-07 RX ADMIN — DIGOXIN 0.12 MG: 125 TABLET ORAL at 10:10

## 2024-10-07 RX ADMIN — HYDROCORTISONE: 25 CREAM TOPICAL at 08:10

## 2024-10-07 NOTE — PLAN OF CARE
ZOYA met with pt and daughter at bedside to discuss pt's choice and home health agency options; pt and daughter said they wished to continue with [facility] upon discharge; ZOYA sent TED referral via [employer]     10/07/24 2459   Post-Acute Status   Post-Acute Authorization Home Health   Home Health Status Referrals Sent

## 2024-10-07 NOTE — PLAN OF CARE
Goals to be met by: 24     Patient will increase functional independence with mobility by performin. Supine to sit with MInimal Assistance  2. Sit to stand transfer with Minimal Assistance  3. Bed to chair transfer with Minimal Assistance using Rolling Walker  4. Gait  x 25 feet with Minimal Assistance using Rolling Walker.

## 2024-10-07 NOTE — PLAN OF CARE
10/07/24 1534   COPPOLA Message   Medicare Outpatient and Observation Notification regarding financial responsibility Given to patient/caregiver;Explained to patient/caregiver;Signed/date by patient/caregiver   Date COPPOLA was signed 10/07/24   Time COPPOLA was signed [id number]

## 2024-10-07 NOTE — PT/OT/SLP EVAL
Physical Therapy Evaluation    Patient Name:  Rosalva Toney   MRN:  [id number]    Recommendations:     Discharge Recommendations: Moderate Intensity Therapy (daughter refusing SNF, insists on Low Intensity & will hire sitters.)   Discharge Equipment Recommendations: none   Barriers to discharge:  medical issues as pt exhibiting OH    Assessment:     Rosalva Toney is a 89 y.o. female admitted with a medical diagnosis of Stercoral colitis.  She presents with the following impairments/functional limitations: weakness, impaired endurance, impaired self care skills, impaired functional mobility, gait instability, impaired balance, pain, decreased safety awareness, impaired cardiopulmonary response to activity.    Pt found HOB elevated with daughter present and agreeable to working with PT. Pt A & O x  3 and has the following co-morbidities: AFib, HTN, Demand Ischemia, Hx R ankle fx/ORIF.  Pt tolerated session only fairly due to BP dropping with position changes, but with pt asymptomatic which keeps pt at high risk of syncope/falls and required maximal to moderate assistance for safe mobilization during session today. Pt would benefit from acute PT during hospitalization to increase strength, endurance and safety with mobility and would benefit from Moderate Intensity Therapy upon discharge. The pt currently demonstrates a need for Moderate Intensity Therapy on a daily basis secondary to decline in functional status over the last 2 months due to illness. At this time the pt's daughter is refusing for pt to have follow up inpatient therapy, but plans to hire sitters and would like Low Intensity Therapy instead.         Rehab Prognosis: Fair; patient would benefit from acute skilled PT services to address these deficits and reach maximum level of function.    Recent Surgery: * No surgery found *      Plan:     During this hospitalization, patient to be seen 5 x/week to address the identified rehab impairments  "via gait training, therapeutic activities, therapeutic exercises and progress toward the following goals:    Plan of Care Expires:  11/04/24    Subjective     Chief Complaint: "don't feel I can stand."/afraid of falling  Patient/Family Comments/goals: return to prior level of function  Pain/Comfort:  Pain Rating 1: 0/10  Pain Addressed 1: Reposition, Distraction, Cessation of Activity    Patients cultural, spiritual, Episcopal conflicts given the current situation:      Living Environment:  Pt lives at [facility] for the last 6 years and was in an independent living apartment until 2 months ago.  Prior to admission, patients level of function was moderate to minimum assistance of one person for transfers oob and for short distance gait in her apartment with staff assisting.  Equipment used at home: rollator, grab bar, 3-in-1 commode, raised toilet, wheelchair.  DME owned (not currently used): none.  Upon discharge, patient will have assistance from facility staff/private caregivers.    Objective:     Communicated with EAGLE Garcia prior to and after session.  Patient found HOB elevated with bed alarm, PureWick, peripheral IV, telemetry  upon PT entry to room.    General Precautions: Standard, fall  Orthopedic Precautions:N/A   Braces: N/A  Respiratory Status: Room air    Exams:  Cognitive Exam:  Patient is oriented to Person, Place, and Situation  RLE ROM: WFL  RLE Strength: grossly 3+ to 4-/5  LLE ROM: WFL  LLE Strength: grossly 3+ to 4-/5    Functional Mobility:  Bed Mobility:  HOB 45 deg 136/89 HR 59  Scooting: moderate assistance and in sitting and maximal assistance of 2 persons in supine  Supine to Sit: moderate assistance and maximal assistance; sitting /83 HR 89  Sit to Supine: moderate assistance and maximal assistance  Transfers:     Sit to Stand:  moderate assistance with no AD and hand-held assist; standing at bed side 92/66 HR 91 with no symptoms      AM-PAC 6 CLICK MOBILITY  Total " Score:12       Treatment & Education:  Pt was educated on the following: call light use, importance of OOB activity and functional mobility to negate the negative effects of prolonged bed rest during this hospitalization, safe transfers/ambulation and discharge planning recommendations/options.      Patient left HOB elevated with all lines intact, call button in reach, bed alarm on, RN notified, and pt's daughter present.    GOALS:   Multidisciplinary Problems       Physical Therapy Goals          Problem: Physical Therapy    Goal Priority Disciplines Outcome Interventions   Physical Therapy Goal     PT, PT/OT     Description: Goals to be met by: 24     Patient will increase functional independence with mobility by performin. Supine to sit with MInimal Assistance  2. Sit to stand transfer with Minimal Assistance  3. Bed to chair transfer with Minimal Assistance using Rolling Walker  4. Gait  x 25 feet with Minimal Assistance using Rolling Walker.                             History:     Past Medical History:   Diagnosis Date    Atrial fibrillation     Hypertension     MVP (mitral valve prolapse)     Skin cancer        Past Surgical History:   Procedure Laterality Date    ANKLE FRACTURE SURGERY      HYSTERECTOMY      MASTECTOMY, RADICAL Left     partial mastecomy Right     twisted bowl         Time Tracking:     PT Received On: 10/07/24  PT Start Time: 1052     PT Stop Time: 1119  PT Total Time (min): 27 min     Billable Minutes: Evaluation 10 and Therapeutic Activity 17      10/07/2024

## 2024-10-07 NOTE — PROGRESS NOTES
[facility]  Progress Note    Patient Name: Rosalva Toney  MRN: [id number]  Patient Class: OP- Observation   Admission Date: 10/6/2024  Length of Stay: 0 days  Attending Physician: Soheila Plascencia MD  Primary Care Provider: Jamie De Luna MD        Subjective:     Principal Problem:Stercoral colitis        HPI:  89-year-old female with pMHx of longstanding persistent atrial fibrillation on Eliquis and hypertension who presented to the ED via EMS from [place] with reports of generalized weakness.  Patient and daughter reports that 2 days ago the patient fell and she has become increasingly weak in her right leg and hip are sore.  She does report that she has been constipated for the last 3 days and that today when she finally had a BM she had blood in her stool.  On evaluation in the ED patient with dark brown stool in the rectal vault occult stool positive.  Sodium 133, glucose 112, hemoglobin 13.4, hematocrit 41.  Initial troponin 20.6.  A CT scan of the abdomen and pelvis was performed which revealed mild-to-moderate rectal distention with stool.  Perirectal and presacral fat stranding/edema is diffuse and moderate that can be seen in proctitis or stercoral colitis.  Bilateral pleural effusions.  Coronary artery calcifications. 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  Patient will be admitted to the hospital and started on IV Zosyn GI consult placed and Eliquis held.       Overview/Hospital Course:  No notes on file    Interval History:  Patient seen and examined this morning.  She is having pain with bowel movements.  She denies any subjective fever, chills, lightheadedness or dizziness.  She states that this pain is relatively new within the last few days.  She denies any chest pain or dyspnea.    Review of Systems   Constitutional:  Positive for activity change and fatigue.   Respiratory:  Negative for shortness of breath.     Cardiovascular:  Negative for chest pain and leg swelling.   Gastrointestinal:  Positive for blood in stool. Negative for abdominal pain, nausea and vomiting.   Neurological:  Positive for weakness. Negative for dizziness and light-headedness.   Psychiatric/Behavioral:  Negative for agitation and confusion.      Objective:     Vital Signs (Most Recent):  Temp: 98.6 °F (37 °C) (10/07/24 0716)  Pulse: 81 (10/07/24 0716)  Resp: 20 (10/07/24 1028)  BP: (!) 145/84 (10/07/24 0716)  SpO2: 97 % (10/07/24 0716) Vital Signs (24h Range):  Temp:  [97.8 °F (36.6 °C)-98.6 °F (37 °C)] 98.6 °F (37 °C)  Pulse:  [80-92] 81  Resp:  [16-20] 20  SpO2:  [95 %-98 %] 97 %  BP: (145-153)/(65-92) 145/84     Weight: 52.2 kg (115 lb)  Body mass index is 19.74 kg/m².    Intake/Output Summary (Last 24 hours) at 10/7/2024 1552  Last data filed at 10/7/2024 1518  Gross per 24 hour   Intake 350 ml   Output --   Net 350 ml         Physical Exam  Vitals reviewed.   Constitutional:       General: She is not in acute distress.     Appearance: Normal appearance. She is not toxic-appearing.   HENT:      Head: Normocephalic and atraumatic.      Mouth/Throat:      Mouth: Mucous membranes are moist.      Pharynx: Oropharynx is clear.   Eyes:      Extraocular Movements: Extraocular movements intact.      Pupils: Pupils are equal, round, and reactive to light.   Cardiovascular:      Rate and Rhythm: Normal rate.      Pulses: Normal pulses.      Heart sounds: Normal heart sounds.   Pulmonary:      Effort: Pulmonary effort is normal.      Breath sounds: Normal breath sounds.   Abdominal:      General: Bowel sounds are normal. There is no distension.      Palpations: Abdomen is soft.      Tenderness: There is no abdominal tenderness.   Musculoskeletal:         General: No swelling. Normal range of motion.      Cervical back: Normal range of motion and neck supple.   Skin:     General: Skin is warm and dry.      Capillary Refill: Capillary refill takes less  than 2 seconds.   Neurological:      General: No focal deficit present.      Mental Status: She is alert and oriented to person, place, and time. Mental status is at baseline.   Psychiatric:         Mood and Affect: Mood normal.         Behavior: Behavior normal.         Judgment: Judgment normal.             Significant Labs: All pertinent labs within the past 24 hours have been reviewed.  BMP:   Recent Labs   Lab 10/06/24  1057   *   *   K 4.2   CL 97   CO2 26   BUN 15   CREATININE 0.7   CALCIUM 9.3     CBC:   Recent Labs   Lab 10/06/24  1057   WBC 9.66   HGB 13.4   HCT 41.0          Significant Imaging: I have reviewed all pertinent imaging results/findings within the past 24 hours.  I have reviewed and interpreted all pertinent imaging results/findings within the past 24 hours.    Assessment/Plan:      * Stercoral colitis  Consult to GI has been placed  Patient not interested in invasive procedures or scoping  Topical steroid cream ordered        Longstanding persistent atrial fibrillation  Patient has pong standing persistent (>12 months) atrial fibrillation. Patient is currently in atrial fibrillation. [id number] Score: 4. The patients heart rate in the last 24 hours is as follows:  Pulse  Min: 80  Max: 92     Antiarrhythmics  metoprolol succinate (TOPROL-XL) 24 hr tablet 25 mg, Daily, Oral  Digoxin  Anticoagulants   Eliquis, holding    Plan  - Replete lytes with a goal of K>4, Mg >2  - Patient is anticoagulated, see medications listed above.  - Patient's afib is currently controlled  - Hold Eliquis for now        Lower GI bleed  Likely related to stercoral colitis and/or proctitis  Consult GI  Patient's hemorrhage is due to gastrointestinal bleed, patient does have a propensity for bleeding due to a medication, the medication is Eliquis.. Patients most recent Hgb, Hct, platelets, and INR are listed below.  Recent Labs     10/06/24  1057   HGB 13.4   HCT 41.0      INR 1.0        Plan  - Will get daily CBC while inpatient  - Will transfuse if Hgb is <7g/dl (<8g/dl in cases of active ACS) or if patient has rapid bleeding leading to hemodynamic instability    Essential hypertension  Patients blood pressure range in the last 24 hours was: BP  Min: 140/71  Max: 163/85.The patient's inpatient anti-hypertensive regimen is listed below:  Current Antihypertensives  amLODIPine tablet 5 mg, Daily, Oral  losartan tablet 100 mg, Daily, Oral  metoprolol succinate (TOPROL-XL) 24 hr tablet 25 mg, Daily, Oral    Plan  - BP is controlled, no changes needed to their regimen        VTE Risk Mitigation (From admission, onward)           Ordered     IP VTE HIGH RISK PATIENT  Once         10/06/24 1426     Place sequential compression device  Until discontinued         10/06/24 1426     Reason for No Pharmacological VTE Prophylaxis  Once        Question:  Reasons:  Answer:  Active Bleeding    10/06/24 1426                    Discharge Planning   MANUEL: 10/8/2024     Code Status: DNR   Is the patient medically ready for discharge?:     Reason for patient still in hospital (select all that apply): Treatment  Discharge Plan A: Home Health                  Soheila Plascencia MD  Department of Hospital Medicine   [facility]

## 2024-10-07 NOTE — PLAN OF CARE
Problem: Gastrointestinal Bleeding  Goal: Optimal Coping with Acute Illness  Outcome: Progressing  Goal: Hemostasis  Outcome: Progressing     Problem: Adult Inpatient Plan of Care  Goal: Plan of Care Review  Outcome: Progressing  Goal: Patient-Specific Goal (Individualized)  Outcome: Progressing  Goal: Absence of Hospital-Acquired Illness or Injury  Outcome: Progressing  Goal: Optimal Comfort and Wellbeing  Outcome: Progressing  Goal: Readiness for Transition of Care  Outcome: Progressing     Problem: Skin Injury Risk Increased  Goal: Skin Health and Integrity  Outcome: Progressing     Problem: Fall Injury Risk  Goal: Absence of Fall and Fall-Related Injury  Outcome: Progressing

## 2024-10-07 NOTE — SUBJECTIVE & OBJECTIVE
Interval History:  Patient seen and examined this morning.  She is having pain with bowel movements.  She denies any subjective fever, chills, lightheadedness or dizziness.  She states that this pain is relatively new within the last few days.  She denies any chest pain or dyspnea.    Review of Systems   Constitutional:  Positive for activity change and fatigue.   Respiratory:  Negative for shortness of breath.    Cardiovascular:  Negative for chest pain and leg swelling.   Gastrointestinal:  Positive for blood in stool. Negative for abdominal pain, nausea and vomiting.   Neurological:  Positive for weakness. Negative for dizziness and light-headedness.   Psychiatric/Behavioral:  Negative for agitation and confusion.      Objective:     Vital Signs (Most Recent):  Temp: 98.6 °F (37 °C) (10/07/24 0716)  Pulse: 81 (10/07/24 0716)  Resp: 20 (10/07/24 1028)  BP: (!) 145/84 (10/07/24 0716)  SpO2: 97 % (10/07/24 0716) Vital Signs (24h Range):  Temp:  [97.8 °F (36.6 °C)-98.6 °F (37 °C)] 98.6 °F (37 °C)  Pulse:  [80-92] 81  Resp:  [16-20] 20  SpO2:  [95 %-98 %] 97 %  BP: (145-153)/(65-92) 145/84     Weight: 52.2 kg (115 lb)  Body mass index is 19.74 kg/m².    Intake/Output Summary (Last 24 hours) at 10/7/2024 1552  Last data filed at 10/7/2024 1518  Gross per 24 hour   Intake 350 ml   Output --   Net 350 ml         Physical Exam  Vitals reviewed.   Constitutional:       General: She is not in acute distress.     Appearance: Normal appearance. She is not toxic-appearing.   HENT:      Head: Normocephalic and atraumatic.      Mouth/Throat:      Mouth: Mucous membranes are moist.      Pharynx: Oropharynx is clear.   Eyes:      Extraocular Movements: Extraocular movements intact.      Pupils: Pupils are equal, round, and reactive to light.   Cardiovascular:      Rate and Rhythm: Normal rate.      Pulses: Normal pulses.      Heart sounds: Normal heart sounds.   Pulmonary:      Effort: Pulmonary effort is normal.      Breath  sounds: Normal breath sounds.   Abdominal:      General: Bowel sounds are normal. There is no distension.      Palpations: Abdomen is soft.      Tenderness: There is no abdominal tenderness.   Musculoskeletal:         General: No swelling. Normal range of motion.      Cervical back: Normal range of motion and neck supple.   Skin:     General: Skin is warm and dry.      Capillary Refill: Capillary refill takes less than 2 seconds.   Neurological:      General: No focal deficit present.      Mental Status: She is alert and oriented to person, place, and time. Mental status is at baseline.   Psychiatric:         Mood and Affect: Mood normal.         Behavior: Behavior normal.         Judgment: Judgment normal.             Significant Labs: All pertinent labs within the past 24 hours have been reviewed.  BMP:   Recent Labs   Lab 10/06/24  1057   *   *   K 4.2   CL 97   CO2 26   BUN 15   CREATININE 0.7   CALCIUM 9.3     CBC:   Recent Labs   Lab 10/06/24  1057   WBC 9.66   HGB 13.4   HCT 41.0          Significant Imaging: I have reviewed all pertinent imaging results/findings within the past 24 hours.  I have reviewed and interpreted all pertinent imaging results/findings within the past 24 hours.

## 2024-10-07 NOTE — ASSESSMENT & PLAN NOTE
Consult to GI has been placed  Patient not interested in invasive procedures or scoping  Topical steroid cream ordered

## 2024-10-07 NOTE — ASSESSMENT & PLAN NOTE
Patient has pong standing persistent (>12 months) atrial fibrillation. Patient is currently in atrial fibrillation. [id number] Score: 4. The patients heart rate in the last 24 hours is as follows:  Pulse  Min: 80  Max: 92     Antiarrhythmics  metoprolol succinate (TOPROL-XL) 24 hr tablet 25 mg, Daily, Oral  Digoxin  Anticoagulants   Eliquis, holding    Plan  - Replete lytes with a goal of K>4, Mg >2  - Patient is anticoagulated, see medications listed above.  - Patient's afib is currently controlled  - Hold Eliquis for now

## 2024-10-07 NOTE — PLAN OF CARE
Problem: Occupational Therapy  Goal: Occupational Therapy Goal  Description: Goals to be met by: 10/31/2024     Patient will increase functional independence with ADLs by performing:    UE Dressing with Supervision.  LE Dressing with Supervision.  Grooming while standing at sink with Supervision.  Toileting from bedside commode with Supervision for hygiene and clothing management.   Supine to sit with Modified [place].  Step transfer with Supervision  Toilet transfer to bedside commode with Supervision.  Increased functional strength to WFL for ADLS.  Upper extremity exercise program x10 reps per handout, with supervision.    Outcome: Progressing

## 2024-10-07 NOTE — ASSESSMENT & PLAN NOTE
Likely related to stercoral colitis and/or proctitis  Consult GI  Patient's hemorrhage is due to gastrointestinal bleed, patient does have a propensity for bleeding due to a medication, the medication is Eliquis.. Patients most recent Hgb, Hct, platelets, and INR are listed below.  Recent Labs     10/06/24  1057   HGB 13.4   HCT 41.0      INR 1.0       Plan  - Will get daily CBC while inpatient  - Will transfuse if Hgb is <7g/dl (<8g/dl in cases of active ACS) or if patient has rapid bleeding leading to hemodynamic instability

## 2024-10-07 NOTE — PT/OT/SLP EVAL
Occupational Therapy   Evaluation, tx    Name: Rosalva Toney  MRN: [id number]  Admitting Diagnosis: Stercoral colitis  Recent Surgery: * No surgery found *    The primary encounter diagnosis was Lower GI bleeding. Diagnoses of Chest pain, Weakness, Longstanding persistent atrial fibrillation, and Essential hypertension were also pertinent to this visit.    Recommendations:     Discharge Recommendations: Moderate Intensity Therapy (daughter declining placement, wants home health. Low Intensity)  Discharge Equipment Recommendations:  none  Barriers to discharge:  None    Assessment:     Rosalva Toney is a 89 y.o. female with a medical diagnosis of Stercoral colitis.  She presents with deconditioning, fatigue, rectal pain 4/10 at rest, 10/10 sitting up in chair. MD made aware. Pt min A supine<>sit, sit<>stand Min A with RW, bed<>chair tf. Min A with RW; LE dressing TA socks toileting TA with PW.. Pt is NPO. Low energy and weakness evident. Pt ox 3. Daughter present and wants  at CT. Pt was Mod I-Min A with self care activities and Mod I with rollator for ambulation in her apartment at [facility]. Pt is not back to baseline. Continue with OT POC.    Performance deficits affecting function: weakness, impaired endurance, impaired self care skills, impaired functional mobility, gait instability, impaired balance, decreased upper extremity function, decreased lower extremity function, decreased coordination, decreased safety awareness, pain, impaired cardiopulmonary response to activity, impaired coordination, impaired fine motor.      Rehab Prognosis: Good and Fair; patient would benefit from acute skilled OT services to address these deficits and reach maximum level of function.       Plan:     Patient to be seen 5 x/week to address the above listed problems via self-care/home management, therapeutic activities, therapeutic exercises  Plan of Care Expires: 11/07/24  Plan of Care Reviewed with: patient,  daughter    Subjective     Chief Complaint: I am weak and I hurt in my rectum.  Patient/Family Comments/goals: Pt agreeable to OT with encouragement from DTR and education by OT.    Occupational Profile:  Living Environment: pt is a resident of [facility]; lives in hr own apartment. Pt has walk n shower with 3n1 commode used as a shower chair.   Previous level of function: Indep-Min A for bathing , ambulated Mod I with rollator; facility delivers meals to apartment.  Roles and Routines: Mother.   Equipment Used at Home: rollator, grab bar, 3-in-1 commode, raised toilet, wheelchair (transport wc. uses 3n1 in shower as shower chair)  Assistance upon Discharge: daughter, sitters -daughter plans on hiring to stay with pt during the day and [facility] staff at night.     Pain/Comfort:  Pain Rating 1: 10/10  Location 1: perirectal  Pain Addressed 1: Cessation of Activity, Distraction, Reposition (MD notified)  Pain Rating Post-Intervention 1: 4/10    Patients cultural, spiritual, Latter day conflicts given the current situation: no    Objective:     Communicated with: nurse prior to session.  Patient found HOB elevated with bed alarm, telemetry, peripheral IV, PureWick upon OT entry to room.    General Precautions: Standard, fall, NPO  Orthopedic Precautions: N/A  Braces: N/A  Respiratory Status: Room air    Occupational Performance:    Bed Mobility:    Patient completed Rolling/Turning to Left with  contact guard assistance, with side rail, and cues for tech  Patient completed Scooting/Bridging with minimum assistance  Patient completed Supine to Sit with minimum assistance, with side rail, and segmental cues  Patient completed Sit to Supine with minimum assistance    Functional Mobility/Transfers:  Patient completed Sit <> Stand Transfer with minimum assistance  with  rolling walker   Patient completed Bed <> Chair Transfer using Step Transfer technique with minimum assistance with rolling walker  Functional Mobility: NA      Activities of Daily Living:  Feeding:  NPO   Grooming: contact guard assistance brushed teeth and washed face seated EOB  Lower Body Dressing: total assistance socks due to limited bending reach when attempted with legs crossed.  Toileting: total assistance with PW; no energy to attempt.     Cognitive/Visual Perceptual:  Cognitive/Psychosocial Skills:     -       Oriented to: Person, Place, and Time   -       Follows Commands/attention:Follows one-step commands  -       Communication: clear/fluent and spoke very few words  -       Memory: NT  -       Safety awareness/insight to disability: impaired   -       Mood/Affect/Coping skills/emotional control: Cooperative and Flat affect  Visual/Perceptual:      -Intact grossly    Physical Exam:  Balance:    -       sitting: SBA-fair plus  dynamic: fair  standing: poor plus  dynamic: poor  Postural examination/scapula alignment:    -       Rounded shoulders  -       Forward head  -       Posterior pelvic tilt  Dominant hand:    -       right  Upper Extremity Range of Motion:     -       Right Upper Extremity: WFL  -       Left Upper Extremity: WFL  Upper Extremity Strength:    -       Right Upper Extremity: Deficits: shld 3+/5, distally 4-/5  -       Left Upper Extremity: Deficits: same as above   Strength:    -       Right Upper Extremity: WFL  -       Left Upper Extremity: WFL  Fine Motor Coordination:    -       Impaired  Left hand, manipulation of objects mild  tremor and Right hand, manipulation of objects mild tremor    AMPAC 6 Click ADL:  AMPAC Total Score: 16    Treatment & Education:  Purpose of OT and POC   Pt seen for safe self care and fx mobility retraining as stated above   DTR present, all questions.concerns addressed within scope.  Pt was not able to sit UIC due to  rectal pain increased to 10/10 from 4/10 from when lying in bed. Pt  was immediately  transferred BTB as per DTR's request because of pt's c/o pain. Repositioning in chair for pain relief  "attempted but to no avail.. MD arrived to room and notified.  DC planning: DTR declining post acute placement for therapy. She stated that she prefers HHOT and that she plans on hiring sitters for the daytime and she will be "in and out"  and staff at [facility] with assist at night.   Call don't fall explained.     Patient left HOB elevated with all lines intact, call button in reach, bed alarm on, MD notified, and DTR present    GOALS:   Multidisciplinary Problems       Occupational Therapy Goals          Problem: Occupational Therapy    Goal Priority Disciplines Outcome Interventions   Occupational Therapy Goal     OT, PT/OT Progressing    Description: Goals to be met by: 10/31/2024     Patient will increase functional independence with ADLs by performing:    UE Dressing with Supervision.  LE Dressing with Supervision.  Grooming while standing at sink with Supervision.  Toileting from bedside commode with Supervision for hygiene and clothing management.   Supine to sit with Modified [place].  Step transfer with Supervision  Toilet transfer to bedside commode with Supervision.  Increased functional strength to WFL for ADLS.  Upper extremity exercise program x10 reps per handout, with supervision.                         History:     Past Medical History:   Diagnosis Date    Atrial fibrillation     Hypertension     MVP (mitral valve prolapse)     Skin cancer          Past Surgical History:   Procedure Laterality Date    ANKLE FRACTURE SURGERY      HYSTERECTOMY      MASTECTOMY, RADICAL Left     partial mastecomy Right     twisted bowl         Time Tracking:     OT Date of Treatment: 10/07/24  OT Start Time: 0939  OT Stop Time: 1006  OT Total Time (min): 27 min    Billable Minutes:Evaluation 10  Self Care/Home Management 17  Total Time 27    10/7/2024  "

## 2024-10-07 NOTE — PLAN OF CARE
[facility]  Initial Discharge Assessment       Primary Care Provider: Jamie De Luna MD    Admission Diagnosis: Lower GI bleed [K92.2]    Admission Date: 10/6/2024  Expected Discharge Date: 10/8/2024    Assessment and facesheet verification complete. Mailing address changed per Daughter Rowena's request. Patient resides at [facility] and receives HH with [facility] . DME and transport needed at time of DC TBD     Transition of Care Barriers: Mobility    Payor: MEDICARE / Plan: MEDICARE PART B ONLY / Product Type: Government /     Extended Emergency Contact Information  Primary Emergency Contact: Joseline Rodas  Address: [address]           [place] [address] [place]  Home Phone: [phone]  Mobile Phone: [phone]  Relation: Daughter  Preferred language: English   needed? No    Discharge Plan A: Home Health  Discharge Plan B: Assisted Living      [id number] - [place] - [address]  [address]  [place] [address]  Phone: [phone] Fax: [phone]      Initial Assessment (most recent)       Adult Discharge Assessment - 10/07/24 1528          Discharge Assessment    Assessment Type Discharge Planning Assessment     Confirmed/corrected address, phone number and insurance Yes     Confirmed Demographics Correct on Facesheet     Source of Information patient     When was your last doctors appointment? 09/07/24     Does patient/caregiver understand observation status Yes     Communicated MANUEL with patient/caregiver Yes     Reason For Admission Lower GI bleed     People in Home facility resident   resides at [facility]    Do you expect to return to your current living situation? Yes     Do you have help at home or someone to help you manage your care at home? Yes     Who are your caregiver(s) and their phone number(s)? [facility]     Prior to hospitilization  cognitive status: Alert/Oriented     Current cognitive status: Alert/Oriented     Walking or Climbing Stairs Difficulty yes     Walking or Climbing Stairs ambulation difficulty, requires equipment     Dressing/Bathing Difficulty yes     Dressing/Bathing bathing difficulty, requires equipment     Home Accessibility wheelchair accessible     Home Layout Able to live on 1st floor     Equipment Currently Used at Home shower chair;rollator;grab bar;raised toilet     Readmission within 30 days? No     Patient currently being followed by outpatient case management? No     Do you currently have service(s) that help you manage your care at home? Yes     Name and Contact number of agency [facility]     Is the pt/caregiver preference to resume services with current agency Yes     Do you take prescription medications? Yes     Do you have prescription coverage? Yes     Do you have any problems affording any of your prescribed medications? No     Is the patient taking medications as prescribed? yes     Who is going to help you get home at discharge? TBD     Are you on dialysis? No     Do you take coumadin? No     Discharge Plan A Home Health     Discharge Plan B Assisted Living     Transition of Care Barriers Mobility        Physical Activity    On average, how many days per week do you engage in moderate to strenuous exercise (like a brisk walk)? 0 days     On average, how many minutes do you engage in exercise at this level? 0 min        Financial Resource Strain    How hard is it for you to pay for the very basics like food, housing, medical care, and heating? Not very hard        Housing Stability    In the last 12 months, was there a time when you were not able to pay the mortgage or rent on time? No     At any time in the past 12 months, were you homeless or living in a shelter (including now)? No        Transportation Needs    Has the lack of transportation kept you from medical appointments, meetings, work or from  getting things needed for daily living? No        Food Insecurity    Within the past 12 months, you worried that your food would run out before you got the money to buy more. Never true     Within the past 12 months, the food you bought just didn't last and you didn't have money to get more. Never true        Stress    Do you feel stress - tense, restless, nervous, or anxious, or unable to sleep at night because your mind is troubled all the time - these days? Not at all        Social Isolation    How often do you feel lonely or isolated from those around you?  Never        Alcohol Use    Q1: How often do you have a drink containing alcohol? Never     Q2: How many drinks containing alcohol do you have on a typical day when you are drinking? Patient does not drink     Q3: How often do you have six or more drinks on one occasion? Never        Utilities    In the past 12 months has the electric, gas, oil, or water company threatened to shut off services in your home? No        Health Literacy    How often do you need to have someone help you when you read instructions, pamphlets, or other written material from your doctor or pharmacy? Never

## 2024-10-08 VITALS
WEIGHT: 115 LBS | TEMPERATURE: 98 F | BODY MASS INDEX: 19.63 KG/M2 | SYSTOLIC BLOOD PRESSURE: 166 MMHG | HEIGHT: 64 IN | HEART RATE: 78 BPM | OXYGEN SATURATION: 94 % | RESPIRATION RATE: 18 BRPM | DIASTOLIC BLOOD PRESSURE: 83 MMHG

## 2024-10-08 PROBLEM — K92.2 LOWER GI BLEED: Status: RESOLVED | Noted: 2024-10-06 | Resolved: 2024-10-08

## 2024-10-08 LAB
ANION GAP SERPL CALC-SCNC: 8 MMOL/L (ref 8–16)
BASOPHILS # BLD AUTO: 0.04 K/UL (ref 0–0.2)
BASOPHILS NFR BLD: 0.4 % (ref 0–1.9)
BUN SERPL-MCNC: 10 MG/DL (ref 10–30)
CALCIUM SERPL-MCNC: 8.9 MG/DL (ref 8.7–10.5)
CHLORIDE SERPL-SCNC: 101 MMOL/L (ref 95–110)
CO2 SERPL-SCNC: 24 MMOL/L (ref 23–29)
CREAT SERPL-MCNC: 0.7 MG/DL (ref 0.5–1.4)
DIFFERENTIAL METHOD BLD: ABNORMAL
EOSINOPHIL # BLD AUTO: 0.1 K/UL (ref 0–0.5)
EOSINOPHIL NFR BLD: 1.1 % (ref 0–8)
ERYTHROCYTE [DISTWIDTH] IN BLOOD BY AUTOMATED COUNT: 14.6 % (ref 11.5–14.5)
EST. GFR  (NO RACE VARIABLE): >60 ML/MIN/1.73 M^2
GLUCOSE SERPL-MCNC: 97 MG/DL (ref 70–110)
HCT VFR BLD AUTO: 43.4 % (ref 37–48.5)
HGB BLD-MCNC: 13.9 G/DL (ref 12–16)
IMM GRANULOCYTES # BLD AUTO: 0.03 K/UL (ref 0–0.04)
IMM GRANULOCYTES NFR BLD AUTO: 0.3 % (ref 0–0.5)
LYMPHOCYTES # BLD AUTO: 1 K/UL (ref 1–4.8)
LYMPHOCYTES NFR BLD: 8.8 % (ref 18–48)
MCH RBC QN AUTO: 29 PG (ref 27–31)
MCHC RBC AUTO-ENTMCNC: 32 G/DL (ref 32–36)
MCV RBC AUTO: 90 FL (ref 82–98)
MONOCYTES # BLD AUTO: 1.4 K/UL (ref 0.3–1)
MONOCYTES NFR BLD: 12.6 % (ref 4–15)
NEUTROPHILS # BLD AUTO: 8.5 K/UL (ref 1.8–7.7)
NEUTROPHILS NFR BLD: 76.8 % (ref 38–73)
NRBC BLD-RTO: 0 /100 WBC
PLATELET # BLD AUTO: 199 K/UL (ref 150–450)
PMV BLD AUTO: 10.6 FL (ref 9.2–12.9)
POTASSIUM SERPL-SCNC: 3.9 MMOL/L (ref 3.5–5.1)
RBC # BLD AUTO: 4.8 M/UL (ref 4–5.4)
SODIUM SERPL-SCNC: 133 MMOL/L (ref 136–145)
WBC # BLD AUTO: 11.08 K/UL (ref 3.9–12.7)

## 2024-10-08 PROCEDURE — 25000003 PHARM REV CODE 250: Performed by: NURSE PRACTITIONER

## 2024-10-08 PROCEDURE — 97530 THERAPEUTIC ACTIVITIES: CPT

## 2024-10-08 PROCEDURE — 36415 COLL VENOUS BLD VENIPUNCTURE: CPT

## 2024-10-08 PROCEDURE — 85025 COMPLETE CBC W/AUTO DIFF WBC: CPT | Performed by: NURSE PRACTITIONER

## 2024-10-08 PROCEDURE — G0378 HOSPITAL OBSERVATION PER HR: HCPCS

## 2024-10-08 PROCEDURE — 80048 BASIC METABOLIC PNL TOTAL CA: CPT

## 2024-10-08 RX ORDER — POLYETHYLENE GLYCOL 3350 17 G/17G
17 POWDER, FOR SOLUTION ORAL DAILY
Qty: 238 G | Refills: 0 | Status: SHIPPED | OUTPATIENT
Start: 2024-10-08

## 2024-10-08 RX ADMIN — MULTIVITAMIN TABLET 1 TABLET: TABLET at 09:10

## 2024-10-08 RX ADMIN — LOSARTAN POTASSIUM 100 MG: 50 TABLET, FILM COATED ORAL at 09:10

## 2024-10-08 RX ADMIN — HYDROCODONE BITARTRATE AND ACETAMINOPHEN 1 TABLET: 5; 325 TABLET ORAL at 09:10

## 2024-10-08 RX ADMIN — AMLODIPINE BESYLATE 5 MG: 5 TABLET ORAL at 09:10

## 2024-10-08 RX ADMIN — DIGOXIN 0.12 MG: 125 TABLET ORAL at 09:10

## 2024-10-08 RX ADMIN — METOPROLOL SUCCINATE 25 MG: 25 TABLET, FILM COATED, EXTENDED RELEASE ORAL at 09:10

## 2024-10-08 RX ADMIN — HYDROCORTISONE: 25 CREAM TOPICAL at 09:10

## 2024-10-08 NOTE — PLAN OF CARE
Patient and family refused placement . Agreeable to return back to Asst Living with [facility] .       10/08/24 0933   Post-Acute Status   Post-Acute Authorization Placement   Post-Acute Placement Status Patient declined/refused

## 2024-10-08 NOTE — CONSULTS
Advance Care Planning     Date: 10/08/2024    [facility]  I engaged the family in a voluntary conversation about advance care planning and we specifically addressed what the goals of care would be moving forward, in light of the patient's change in clinical status, specifically weakness/multiple frequent falls/colitis.  We did specifically address the patient's likely prognosis, which is  guarded .  We explored the patient's values and preferences for future care.  The family endorses that what is most important right now is to focus on spending time at home, avoiding the hospital, and remaining as independent as possible    Accordingly, we have decided that the best plan to meet the patient's goals includes continuing with treatment. Daughter Joseline desires for pt to return home with HH/PT/OT with the additional support of an At Home Palliative Care Program and has [facility].     A total of 35 min was spent on advance care planning, goals of care discussion, emotional support, formulating and communicating prognosis and exploring burden/benefit of various approaches of treatment. This discussion occurred on a fully voluntary basis with the verbal consent of the patient and/or family.     CM and medical team updated. PC Team will sign off. Thank you for consult we hope we have been helpful. DNR status confirmed.

## 2024-10-08 NOTE — ASSESSMENT & PLAN NOTE
GI has seen and evaluated the patient  Patient not interested in invasive procedures or scoping  Daily miralax on DC

## 2024-10-08 NOTE — ACP (ADVANCE CARE PLANNING)
Advance Care Planning   [facility]  Palliative Care   Psychosocial Assessment    Patient Name: Rosalva Toney  MRN: [id number]  Admission Date: 10/6/2024  Hospital Length of Stay: 0 days  Code Status: DNR   Attending Provider: Soheila Plascencia MD  Palliative Care Provider:   Primary Care Physician: Jamie De Luna MD  Principal Problem:Stercoral colitis    Reason for Referral: psychosocial support  Consult Order Date:   Primary CM/SW:    Present during Interview: patient, relative(s), and ER records.      Primary Language:English   Needed: no      Past Medical Situation:   PMH:   Past Medical History:   Diagnosis Date    Atrial fibrillation     Hypertension     MVP (mitral valve prolapse)     Skin cancer      Mental Health/Substance Use History: None Disclosed   Risk of Abuse, neglect or exploitation:  None Disclosed   Current or Previous Trauma and/or evidence of PTSD: None Disclosed   Non-traditional Health practices: None Disclosed     Understanding of diagnosis and prognosis: Good   Experience/Comfort level with health care system: Good     Patients Mental Status: Awake, Alert and oriented     Socio-Economic Factors/Resources:  Address: [address] [place] [address]  Phone Number: [phone] (home)     Marital Status:    Household composition: The pt lives alone at [facility] and has 2 living daughters.     Children:  Daughter- Joseline Spilling [phone]  Another Daughter that lives out of town    Patient/Family perceptions about Caregiving Needs; availability and capacity: The pt lived with her adult daughter in [facility] prior to admit to [facility] about 6 years ago due to falls. Recenlty the pts daughter Joseline has hired sitters from [facility] to stay with the pt in the daytime hours .    Family Dynamics/Relationships: The pts daughter is asking good questions and preparing her mom for end of life care.     Patient/Family  Strengths/Resilience:  Patient/Family Coping: The family has the means to hire additional support and is realistic in the patients goals.     Activities of Daily Living:  Support Systems-Family & Community (Home Health, HME etc):  Prior to admit the pt was independent in ADL's but sat in the chair most of the day. She began to have a rapid decline requiring more than just the assistance of her walker for toileting. She has HH services through [facility] and Dr. De Luna her PCP sees her at [place].     Transportation:  yes    Work/Education History: Retired   Self-Care Activities/Hobbies: The daughter reports that the pt is active and still riding horses at the age of 80.      History: no    Financial Resources:Medicare      Advanced Care Planning & Legal Concerns:   Advanced Directives/Living Will: no  [place]ST: no   Planning:  no    Power of : no  Surrogate Decision Maker:     Emergency Contacts:    Spirituality, Culture & Coping Mechanisms:  F- Kami and Belief: Patient Refused Per the pts daughter the pt is not a spiritual person.      I - Importance:     C - Community/Culture Values:     A - Address in Care:       Goals/Hopes/Expectations: To discharge home and regain some of her daily independence.       Preferences about EOL Environment: (own bed, family nearby, pets, music, etc)  None disclosed.     Complicated Bereavement Risk Assessment Tool (CBRAT)  Reference:  [facility] Clinical Practice Group (May 2016). Bereavement Risk Screening and Management Guidelines.  Retrieved from: http://www.grpcc.com.au/wp-content/uploads//CFOBX-Zivnajjbckr-Dsjsinmyt-and-Management-Guideline-2016.pdf      Bereaved Client Characteristics   Under 18      no  Was a Twin   no  Young Spouse   no  Elderly Spouse    no  Isolated    no  Lacks Meaningful Social Support   no  Dissatisfied with help available during illness   no  New to Financial Oblong  no  New to Decision-Making   no    Illness  Inherited Disorder   no  Stigmatized Disease in the family/community   no  Lengthy/Burdensome   no none      Bereaved Client's History of Loss   Cumulative Multiple Losses   no  Previous Mental Health Illnesses   no  Current Mental Health Illness   no  Other Significant Health Issues   yes   Migrant/Refugee   no Death  Sudden or Unexpected   no  Traumatic Circumstances Associated with Death   no  Significant Cultural/Social Burdens as a result of Death   no   Relationship with   Profound Lifelong Partner   no  Highly Dependent    no  Antagonistic   no  Ambivalent   no  Deeply Connected   no  Culturally Defined   no   Risk Factors Scores  0-2  Low  3-5  Moderate  5+  High  All persons scoring moderate to high presume to be at risk**    (** It is acknowledged that protective factors and resilience may outweigh apparent risk factors.        Discharge Planning Needs/Plan of Care:       The pts daughter expressed a desire to return to [facility] assisted living with resumption of [place]  services including PT/OT services. She would also like a referral to a palliative medicine program to assist her in the transition to hospice once appropriate. She is agreeable to following up with me virtually in clinic for continued emotional support and follow up.     Criselda Horta, LCSW-BACS

## 2024-10-08 NOTE — ASSESSMENT & PLAN NOTE
Likely related to stercoral colitis and/or proctitis  Consult GI  Patient's hemorrhage is due to gastrointestinal bleed, patient does have a propensity for bleeding due to a medication, the medication is Eliquis.. Patients most recent Hgb, Hct, platelets, and INR are listed below.  Recent Labs     10/06/24  1057 10/08/24  0431   HGB 13.4 13.9   HCT 41.0 43.4    199   INR 1.0  --        Plan  - Will get daily CBC while inpatient  - Will transfuse if Hgb is <7g/dl (<8g/dl in cases of active ACS) or if patient has rapid bleeding leading to hemodynamic instability

## 2024-10-08 NOTE — CONSULTS
GASTROENTEROLOGY INPATIENT CONSULT NOTE  Patient Name: Rosalva Toney  Patient MRN: [id number]  Patient : 1925    Admit Date: 10/6/2024  Service date: 10/7/2024    Reason for Consult: abdominal pain, constipation    PCP: Jamie De Luna MD    Chief Complaint   Patient presents with    Weakness     Increased weakness x1 month. Pt noted blood in toilet after bowel movement.        HPI: Patient is a 89 y.o. female with PMHx  afib eliqius admitted with lower abdominal rectal spasticity associated with decreased defecation. Ct imaging showing probable stercoral colitis.  Passing small bowels.  .     Past Medical History:  Past Medical History:   Diagnosis Date    Atrial fibrillation     Hypertension     MVP (mitral valve prolapse)     Skin cancer         Past Surgical History:  Past Surgical History:   Procedure Laterality Date    ANKLE FRACTURE SURGERY      HYSTERECTOMY      MASTECTOMY, RADICAL Left     partial mastecomy Right     twisted bowl          Home Medications:  Medications Prior to Admission   Medication Sig Dispense Refill Last Dose/Taking    digoxin (LANOXIN) 125 mcg tablet Take 1 tablet (0.125 mg total) by mouth once daily. 90 tablet 1 10/6/2024 Morning    ELIQUIS 2.5 mg Tab Take 1 tablet (2.5 mg total) by mouth 2 (two) times a day. 180 tablet 3 10/6/2024 Morning    felodipine (PLENDIL) 5 MG 24 hr tablet Take 1 tablet (5 mg total) by mouth 2 (two) times daily. 180 tablet 3 10/6/2024 Morning    losartan (COZAAR) 100 MG tablet Take 1 tablet (100 mg total) by mouth once daily. 90 tablet 2 10/6/2024 Morning    metoprolol succinate (TOPROL-XL) 25 MG 24 hr tablet Take 1 tablet (25 mg total) by mouth once daily. 90 tablet 3 10/5/2024 Evening    senna-docusate 8.6-50 mg (SENNA WITH DOCUSATE SODIUM) 8.6-50 mg per tablet Take 1 tablet by mouth once daily.   10/6/2024 Morning    VIT C/VIT E/LUTEIN/MIN/OMEGA-3 (OCUVITE ORAL) Take by mouth.   10/6/2024 Morning       Inpatient Medications:   amLODIPine  5  mg Oral Daily    digoxin  0.125 mg Oral Daily    hydrocortisone   Rectal BID    losartan  100 mg Oral Daily    metoprolol succinate  25 mg Oral Daily    multivitamin  1 tablet Oral Daily    pantoprazole  40 mg Intravenous BID       Current Facility-Administered Medications:     acetaminophen, 650 mg, Oral, Q8H PRN    acetaminophen, 650 mg, Oral, Q4H PRN    aluminum-magnesium hydroxide-simethicone, 30 mL, Oral, QID PRN    dextrose 50%, 12.5 g, Intravenous, PRN    dextrose 50%, 25 g, Intravenous, PRN    glucagon (human recombinant), 1 mg, Intramuscular, PRN    glucose, 16 g, Oral, PRN    glucose, 24 g, Oral, PRN    HYDROcodone-acetaminophen, 1 tablet, Oral, Q6H PRN    magnesium oxide, 800 mg, Oral, PRN    magnesium oxide, 800 mg, Oral, PRN    melatonin, 6 mg, Oral, Nightly PRN    naloxone, 0.02 mg, Intravenous, PRN    ondansetron, 4 mg, Intravenous, Q6H PRN    potassium bicarbonate, 35 mEq, Oral, PRN    potassium bicarbonate, 50 mEq, Oral, PRN    potassium bicarbonate, 60 mEq, Oral, PRN    potassium, sodium phosphates, 2 packet, Oral, PRN    potassium, sodium phosphates, 2 packet, Oral, PRN    potassium, sodium phosphates, 2 packet, Oral, PRN    sodium chloride 0.9%, 2 mL, Intravenous, PRN    Review of patient's allergies indicates:  No Known Allergies    Social History:   Social History     Occupational History    Not on file   Tobacco Use    Smoking status: Never    Smokeless tobacco: Never   Substance and Sexual Activity    Alcohol use: Yes     [facility]    Drug use: No    Sexual activity: Not on file       Family History:   Family History   Problem Relation Name Age of Onset    Glaucoma Neg Hx      Macular degeneration Neg Hx      Retinal detachment Neg Hx         Review of Systems:  A 10 point review of systems was performed and was normal, except as mentioned in the HPI, including constitutional, HEENT, heme, lymph, cardiovascular, respiratory, gastrointestinal, genitourinary, neurologic,  "endocrine, psychiatric and musculoskeletal.      OBJECTIVE:    Physical Exam:  24 Hour Vital Sign Ranges: Temp:  [97.6 °F (36.4 °C)-98.6 °F (37 °C)] 98.5 °F (36.9 °C)  Pulse:  [73-83] 73  Resp:  [16-20] 18  SpO2:  [95 %-98 %] 95 %  BP: (145-163)/(84-92) 163/92  Most recent vitals: BP (!) 163/92 Comment: notified nurse  Pulse 73   Temp 98.5 °F (36.9 °C) (Oral)   Resp 18   Ht 5' 4" (1.626 m)   Wt 52.2 kg (115 lb)   SpO2 95%   BMI 19.74 kg/m²    GEN: well-developed, well-nourished, awake and alert, non-toxic appearing adult  HEENT: PERRL, sclera anicteric, oral mucosa pink and moist without lesion  NECK: trachea midline; Good ROM  CV: regular rate and rhythm, no murmurs or gallops  RESP: clear to auscultation bilaterally, no wheezes, rhonci or rales  ABD: soft, non-tender, non-distended, normal bowel sounds  EXT: no swelling or edema, 2+ pulses distally  SKIN: no rashes or jaundice  Rectal- anal stricture, no fecal impaction    Labs:   Recent Labs     10/06/24  1057   WBC 9.66   MCV 89        Recent Labs     10/06/24  1057   *   K 4.2   CL 97   CO2 26   BUN 15   *     No results for input(s): "ALB" in the last 72 hours.    Invalid input(s): "ALKP", "SGOT", "SGPT", "TBIL", "DBIL", "TPRO"  Recent Labs     10/06/24  1057   INR 1.0         Radiology Review:  CT Abdomen Pelvis With IV Contrast NO Oral Contrast   Final Result      1. Diverticulosis.   2. Mild to moderate rectal distention with stool.   3. Perirectal and presacral fat stranding/edema is diffuse and moderate.  Although nonspecific, this can be seen with proctitis or stercoral colitis in the appropriate clinical setting.   4. Coronary artery calcifications.   5. Bilateral pleural effusions.   6. 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         Electronically signed by: José Rosen   Date:    10/06/2024   Time:    15:08            IMPRESSION / RECOMMENDATIONS:  Presumed stercoral " colitis  -miralax regimen ordered and recommend ongoing daily miralax use  -offered endoscopy to pt/daughter but they are not interested in invasive procedures  -reconsult as needed    Thank you for this consult.    Nicolas Sheffield  10/7/2024  10:38 PM

## 2024-10-08 NOTE — PT/OT/SLP PROGRESS
Physical Therapy Treatment    Patient Name:  Rosalva Toney   MRN:  [id number]    Recommendations:     Discharge Recommendations: Moderate Intensity Therapy (daughter refusing SNF, insists on Low Intensity & will hire sitters.)  Discharge Equipment Recommendations: none  Barriers to discharge: None and Pt's daughter declining SNF, but has hired sitters to assist pt in her [facility] apartment.    Assessment:     Rosalva Toney is a 89 y.o. female admitted with a medical diagnosis of Stercoral colitis.  She presents with the following impairments/functional limitations: weakness, impaired endurance, impaired self care skills, impaired functional mobility, gait instability, impaired balance, pain, decreased safety awareness, impaired cardiopulmonary response to activity.  Orthostatics negative today.     Rehab Prognosis: Fair and Poor; patient would benefit from acute skilled PT services to address these deficits and reach maximum level of function.    Recent Surgery: * No surgery found *      Plan:     During this hospitalization, patient to be seen 5 x/week to address the identified rehab impairments via gait training, therapeutic activities, therapeutic exercises and progress toward the following goals:    Plan of Care Expires:  11/04/24    Subjective     Chief Complaint: perirectal pain in sitting & c/o burning in rectal area while having incontinent BM.  Patient/Family Comments/goals: return home  Pain/Comfort:  Pain Rating 1:  (not rated)  Location 1: perirectal (in sitting position)  Pain Addressed 1: Reposition, Distraction, Cessation of Activity      Objective:     Communicated with EAGLE Noriega prior to session.  Patient found HOB elevated with bed alarm, PureWick, peripheral IV, telemetry upon PT entry to room.     General Precautions: Standard, fall  Orthopedic Precautions: N/A  Braces: N/A  Respiratory Status: Room air     Functional Mobility:  Bed Mobility:     Scooting: maximal assistance and of 1-2  persons  Supine to Sit: moderate assistance and of 1-2 persons  Sit to Supine: maximal assistance and of 2 persons  Transfers:     Sit to Stand:  maximal assistance and of 1-2 persons with rolling walker with pt leaning posteriorly. Pt reported having a bowel accident in her brief during standing. Pt sat briefly and stood a second time to obtain standing BP then returned to bed as noted for extender to clean/change brief.       AM-PAC 6 CLICK MOBILITY          Treatment & Education:  Pt was educated on the following: call light use, importance of OOB activity and functional mobility to negate the negative effects of prolonged bed rest during this hospitalization, safe transfers/ambulation and discharge planning recommendations/options.      Patient left left sidelying with all lines intact, call button in reach, and extender present to clean pt.    GOALS:   Multidisciplinary Problems       Physical Therapy Goals       Not on file              Multidisciplinary Problems (Resolved)          Problem: Physical Therapy    Goal Priority Disciplines Outcome Interventions   Physical Therapy Goal   (Resolved)     PT, PT/OT Met    Description: Goals to be met by: 24     Patient will increase functional independence with mobility by performin. Supine to sit with MInimal Assistance  2. Sit to stand transfer with Minimal Assistance  3. Bed to chair transfer with Minimal Assistance using Rolling Walker  4. Gait  x 25 feet with Minimal Assistance using Rolling Walker.                             Time Tracking:     PT Received On: 10/08/24  PT Start Time: 1005     PT Stop Time: 1029  PT Total Time (min): 24 min     Billable Minutes: Therapeutic Activity 24    Treatment Type: Treatment  PT/PTA: PT     Number of PTA visits since last PT visit: 0     10/08/2024

## 2024-10-08 NOTE — PLAN OF CARE
Problem: Gastrointestinal Bleeding  Goal: Optimal Coping with Acute Illness  Outcome: Progressing  Goal: Hemostasis  Outcome: Progressing     Problem: Adult Inpatient Plan of Care  Goal: Plan of Care Review  Outcome: Progressing  Goal: Patient-Specific Goal (Individualized)  Outcome: Progressing  Goal: Absence of Hospital-Acquired Illness or Injury  Outcome: Progressing  Goal: Optimal Comfort and Wellbeing  Outcome: Progressing  Goal: Readiness for Transition of Care  Outcome: Progressing     Problem: Skin Injury Risk Increased  Goal: Skin Health and Integrity  Outcome: Progressing     Problem: Fall Injury Risk  Goal: Absence of Fall and Fall-Related Injury  Outcome: Progressing     Problem: Coping Ineffective  Goal: Effective Coping  Outcome: Progressing

## 2024-10-08 NOTE — PT/OT/SLP PROGRESS
Occupational Therapy      Patient Name:  Rosalva Toney   MRN:  [id number]    Patient not seen today secondary to  (pt discharged home prior to OT visit). Will follow-up no.    10/8/2024

## 2024-10-08 NOTE — PLAN OF CARE
Virtual follow up appt added to pts AVS for 10/16/24 at 10 am.    10/08/24 1144   Discharge Assessment   Assessment Type Discharge Planning Reassessment

## 2024-10-08 NOTE — PLAN OF CARE
SW met pt at bedside to discuss PT/OT recs for SNF. Pt's granddaughter was present, but pt and granddaughter requested SW come back when the daughter, Joseline was present.  Pt and family said they would rather not do SNF; SW explained the benefits of SNF based on pt needs and provided list of facilities within 25 mi for pt and family to look over. SW will return before 10 AM to f/u and obtain signature on pt choice form.

## 2024-10-08 NOTE — SUBJECTIVE & OBJECTIVE
Interval History:  Patient is seen and examined this morning on the day for discharge.  She was deemed to be in stable condition.  She is hemodynamically stable without any initial hematochezia.  She continues to have mild pain which is well-controlled with Tylenol.    Review of Systems   Constitutional:  Negative for activity change and fatigue.   Respiratory:  Negative for shortness of breath.    Cardiovascular:  Negative for chest pain and leg swelling.   Gastrointestinal:  Negative for abdominal pain, blood in stool, nausea and vomiting.   Neurological:  Negative for dizziness, weakness and light-headedness.   Psychiatric/Behavioral:  Negative for agitation and confusion.      Objective:     Vital Signs (Most Recent):  Temp: 98.3 °F (36.8 °C) (10/08/24 0732)  Pulse: 78 (10/08/24 0916)  Resp: 18 (10/08/24 0916)  BP: (!) 166/83 (10/08/24 0916)  SpO2: (!) 94 % (10/08/24 0732) Vital Signs (24h Range):  Temp:  [97.6 °F (36.4 °C)-98.5 °F (36.9 °C)] 98.3 °F (36.8 °C)  Pulse:  [73-83] 78  Resp:  [18-19] 18  SpO2:  [94 %-98 %] 94 %  BP: (129-166)/(64-92) 166/83     Weight: 52.2 kg (115 lb)  Body mass index is 19.74 kg/m².    Intake/Output Summary (Last 24 hours) at 10/8/2024 1215  Last data filed at 10/8/2024 0922  Gross per 24 hour   Intake 400 ml   Output --   Net 400 ml         Physical Exam  Vitals reviewed.   Constitutional:       General: She is not in acute distress.     Appearance: Normal appearance. She is not toxic-appearing.   HENT:      Head: Normocephalic and atraumatic.      Mouth/Throat:      Mouth: Mucous membranes are moist.      Pharynx: Oropharynx is clear.   Eyes:      Extraocular Movements: Extraocular movements intact.      Pupils: Pupils are equal, round, and reactive to light.   Cardiovascular:      Rate and Rhythm: Normal rate.      Pulses: Normal pulses.      Heart sounds: Normal heart sounds.   Pulmonary:      Effort: Pulmonary effort is normal.      Breath sounds: Normal breath sounds.    Abdominal:      General: Bowel sounds are normal. There is no distension.      Palpations: Abdomen is soft.      Tenderness: There is no abdominal tenderness.   Musculoskeletal:         General: No swelling. Normal range of motion.      Cervical back: Normal range of motion and neck supple.   Skin:     General: Skin is warm and dry.      Capillary Refill: Capillary refill takes less than 2 seconds.   Neurological:      General: No focal deficit present.      Mental Status: She is alert and oriented to person, place, and time. Mental status is at baseline.   Psychiatric:         Mood and Affect: Mood normal.         Behavior: Behavior normal.         Judgment: Judgment normal.             Significant Labs: All pertinent labs within the past 24 hours have been reviewed.  BMP:   Recent Labs   Lab 10/08/24  0431   GLU 97   *   K 3.9      CO2 24   BUN 10   CREATININE 0.7   CALCIUM 8.9     CBC:   Recent Labs   Lab 10/08/24  0431   WBC 11.08   HGB 13.9   HCT 43.4          Significant Imaging: I have reviewed all pertinent imaging results/findings within the past 24 hours.  I have reviewed and interpreted all pertinent imaging results/findings within the past 24 hours.

## 2024-10-08 NOTE — PLAN OF CARE
Patient cleared for discharge from case management standpoint.    Follow up appointments scheduled and added to AVS.    Chart and discharge orders reviewed.  Patient discharged home to [facility] with no further case management needs. Pt. Waiting on ambulance for transport.    SW sent referral to [facility] palliative care program per pt request via [employer].        10/08/24 1028   Final Note   Assessment Type Final Discharge Note   Anticipated Discharge Disposition [facility] Resources/Appts/Education Provided Provided patient/caregiver with written discharge plan information;Provided education on problems/symptoms using teachback;Appointments scheduled and added to AVS   Post-Acute Status   Post-Acute Authorization [facility] Status Set-up Complete/Auth obtained   Discharge Delays (!) Ambulance Transport/Facility Transport

## 2024-10-08 NOTE — HOSPITAL COURSE
89-year-old lady with history of hypertension presents with concerns at her assisted living facility that she was losing blood in her stool.  On presentation she was hemodynamically stable, not found to have any anemia, electrolyte abnormalities were NAREN.  Imaging findings were, however, consistent with stercoral colitis.  GI was consulted and saw and evaluated the patient.  Also saw and spoke with the patient's oldest daughter.  Given that the patient has no interest in invasive procedures or even scoping medical management was opted.  Patient to continue daily MiraLax.  This was discussed directly with the patient and her daughter on the day of discharge.  She was seen and examined was deemed to be in scale stable condition for transfer back.

## 2024-10-08 NOTE — DISCHARGE SUMMARY
[facility] Medicine  Discharge Summary      Patient Name: Rosalva Toney  MRN: [id number]  JOSEPH: [id number]  Patient Class: OP- Observation  Admission Date: 10/6/2024  Hospital Length of Stay: 0 days  Discharge Date and Time:  10/08/2024 12:18 PM  Attending Physician: No att. providers found   Discharging Provider: Soheila Plascencia MD  Primary Care Provider: Jamie De Luna MD    Primary Care Team: Networked reference to record PCT     HPI:   89-year-old female with pMHx of longstanding persistent atrial fibrillation on Eliquis and hypertension who presented to the ED via EMS from [place] with reports of generalized weakness.  Patient and daughter reports that 2 days ago the patient fell and she has become increasingly weak in her right leg and hip are sore.  She does report that she has been constipated for the last 3 days and that today when she finally had a BM she had blood in her stool.  On evaluation in the ED patient with dark brown stool in the rectal vault occult stool positive.  Sodium 133, glucose 112, hemoglobin 13.4, hematocrit 41.  Initial troponin 20.6.  A CT scan of the abdomen and pelvis was performed which revealed mild-to-moderate rectal distention with stool.  Perirectal and presacral fat stranding/edema is diffuse and moderate that can be seen in proctitis or stercoral colitis.  Bilateral pleural effusions.  Coronary artery calcifications. 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  Patient will be admitted to the hospital and started on IV Zosyn GI consult placed and Eliquis held.       * No surgery found *      Hospital Course:   89-year-old lady with history of hypertension presents with concerns at her assisted living facility that she was losing blood in her stool.  On presentation she was hemodynamically stable, not found to have any anemia, electrolyte abnormalities were NAREN.  Imaging findings were, however, consistent  with stercoral colitis.  GI was consulted and saw and evaluated the patient.  Also saw and spoke with the patient's oldest daughter.  Given that the patient has no interest in invasive procedures or even scoping medical management was opted.  Patient to continue daily MiraLax.  This was discussed directly with the patient and her daughter on the day of discharge.  She was seen and examined was deemed to be in scale stable condition for transfer back.     Goals of Care Treatment Preferences:  Code Status: DNR       La[place]: Yes  What is most important right now is to focus on spending time at home, avoiding the hospital, remaining as independent as possible.  Accordingly, we have decided that the best plan to meet the patient's goals includes continuing with treatment.      SDOH Screening:  The patient was screened for utility difficulties, food insecurity, transport difficulties, housing insecurity, and interpersonal safety and there were no concerns identified this admission.     Consults:   Consults (From admission, onward)          Status Ordering Provider     Inpatient consult to Palliative Care  Once        Provider:  Kar James MD    Completed ROMINA BEAUCHAMP     Inpatient consult to Gastroenterology  Once        Provider:  Rasheeda Kaplan MD    Completed DAJA CALL            GI  * Stercoral colitis  GI has seen and evaluated the patient  Patient not interested in invasive procedures or scoping  Daily miralax on DC          Lower GI bleed-resolved as of 10/8/2024  Likely related to stercoral colitis and/or proctitis  Consult GI  Patient's hemorrhage is due to gastrointestinal bleed, patient does have a propensity for bleeding due to a medication, the medication is Eliquis.. Patients most recent Hgb, Hct, platelets, and INR are listed below.  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 Go on 10/15/2024.    Specialties: Family Medicine, Home Health Services, Hospice Services  Why: 11:40 AM for hospital follow-up  Contact information:  [address] [address]  [phone]               Criselda Horta LCS Follow up on 10/16/2024.    Specialty: Licensed Clinical   Why: Hospital follow up with - Virtual Appointment 10:00 am    [phone]  Contact information:  [address] [facility] [address]  [phone]                           Patient Instructions:      Diet Cardiac     Diet Adult Regular     Other restrictions (specify):   Order Comments: Assistance with ADLs as indicated     Other restrictions (specify):   Order Comments: Assistance with ADLs as indicated     HOME HEALTH ORDERS     Order Specific Question Answer Comments   What Home Health Agency is the patient currently using? Other/External      SUBSEQUENT HOME HEALTH ORDERS   Order Comments: Resume previous HH orders. PT and OT to evaluate and treat .     Order Specific Question Answer Comments   What Home Health Agency is the patient currently using? Other/External        Significant Diagnostic Studies: Labs: BMP:   Recent Labs   Lab 10/08/24  0431   GLU 97   *   K  3.9      CO2 24   BUN 10   CREATININE 0.7   CALCIUM 8.9    and CBC   Recent Labs   Lab 10/08/24  0431   WBC 11.08   HGB 13.9   HCT 43.4          Pending Diagnostic Studies:       None           Medications:  Reconciled Home Medications:      Medication List        START taking these medications      polyethylene glycol 17 gram/dose powder  Commonly known as: MIRALAX  Take 17 g by mouth once daily.            CONTINUE taking these medications      digoxin 125 mcg tablet  Commonly known as: LANOXIN  Take 1 tablet (0.125 mg total) by mouth once daily.     ELIQUIS 2.5 mg Tab  Generic drug: apixaban  Take 1 tablet (2.5 mg total) by mouth 2 (two) times a day.     felodipine 5 MG 24 hr tablet  Commonly known as: PLENDIL  Take 1 tablet (5 mg total) by mouth 2 (two) times daily.     losartan 100 MG tablet  Commonly known as: COZAAR  Take 1 tablet (100 mg total) by mouth once daily.     metoprolol succinate 25 MG 24 hr tablet  Commonly known as: TOPROL-XL  Take 1 tablet (25 mg total) by mouth once daily.     OCUVITE ORAL  Take by mouth.     SENNA WITH DOCUSATE SODIUM 8.6-50 mg per tablet  Generic drug: senna-docusate 8.6-50 mg  Take 1 tablet by mouth once daily.              Indwelling Lines/Drains at time of discharge:   Lines/Drains/Airways       None                   Time spent on the discharge of patient: 35 minutes         Soheila Plascencia MD  Department of Hospital Medicine  [facility]

## 2024-10-08 NOTE — PLAN OF CARE
ZOYA called [facility] [phone] to confirm continuation of services; SOC date 10/9/24; Pt. Medically ready to dc; ZOYA requesting ambulance for transport; ZOYA called Juanjose from [place] @ [phone] to inform of pt dc and ready to return to facility       10/08/24 0953   Post-Acute Status   Post-Acute Authorization Home Health   Home Health Status Set-up Complete/Auth obtained   Discharge Delays (!) Ambulance Transport/Facility Transport

## 2024-10-09 ENCOUNTER — TELEPHONE (OUTPATIENT)
Dept: FAMILY MEDICINE | Facility: CLINIC | Age: 89
End: 2024-10-09
Payer: MEDICARE

## 2024-10-09 NOTE — TELEPHONE ENCOUNTER
----- Message from Mela sent at 10/9/2024 11:46 AM CDT -----  Vm:[id number]    Pt's daughter called to state that they will not be able to come to the office for her appointment on 10/15. However, she would like Dr. De Luna to put PT on the schedule for his [place] check ups.    Joseline: [phone]

## 2024-10-14 ENCOUNTER — TELEPHONE (OUTPATIENT)
Dept: FAMILY MEDICINE | Facility: CLINIC | Age: 89
End: 2024-10-14
Payer: MEDICARE
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
## 2024-10-14 NOTE — TELEPHONE ENCOUNTER
----- Message from Mitzi sent at 10/14/2024 11:57 AM CDT -----  -11:43- richard brown is calling to get an order for hospice. Rosalva does not want home health and she cancelled it as of today. Please call about hospice   [phone]

## 2024-10-14 NOTE — TELEPHONE ENCOUNTER
----- Message from Mitzi sent at 10/14/2024 12:31 PM CDT -----  - 12:05- pt daughter richard is calling to talk to nurse about [facility]. That is who tera recommended   [phone]

## 2024-11-18 ENCOUNTER — DOCUMENT SCAN (OUTPATIENT)
Dept: HOME HEALTH SERVICES | Facility: HOSPITAL | Age: 89
End: 2024-11-18
Payer: MEDICARE

## 2025-04-01 NOTE — PROGRESS NOTES
[facility] POST-OP NOTE    Subjective:           Chief Complaint:   Chief Complaint   Patient presents with    Right Ankle - Injury     SUTURES-Right Ankle ORIF ([facility] 12.19.18). Right ankle still hurts.       Past Medical History:   Diagnosis Date    Hypertension     MVP (mitral valve prolapse)     Skin cancer        Past Surgical History:   Procedure Laterality Date    ANKLE FRACTURE SURGERY      HYSTERECTOMY      partial mastecomy      twisted bowl         Current Outpatient Medications   Medication Sig    ASCORBATE CALCIUM (VITAMIN C ORAL) Take by mouth.    aspirin 325 MG tablet Take 325 mg by mouth once daily.    CALCIUM ORAL Take by mouth.    CYANOCOBALAMIN, VITAMIN B-12, (VITAMIN B-12 ORAL) Take by mouth.    DIGOX 125 mcg tablet TK 1 T PO QD    ELIQUIS 2.5 mg Tab     felodipine (PLENDIL) 5 MG 24 hr tablet Take 5 mg by mouth 2 (two) times daily.    fish oil-omega-3 fatty acids 300-1,000 mg capsule Take 2 g by mouth once daily.    LECITHIN ORAL Take by mouth.    losartan (COZAAR) 100 MG tablet Take 100 mg by mouth once daily.    metoprolol succinate (TOPROL-XL) 25 MG 24 hr tablet TK 1 T PO QD    oxyCODONE (ROXICODONE) 5 MG immediate release tablet TK 1 T PO Q 6 H PRN P    UBIDECARENONE (CO Q-10 ORAL) Take by mouth.    VIT C/VIT E/LUTEIN/MIN/OMEGA-3 (OCUVITE ORAL) Take by mouth.    WHEAT GERM OIL ORAL Take by mouth.     No current facility-administered medications for this visit.        Review of patient's allergies indicates:  No Known Allergies    Family History   Problem Relation Age of Onset    Glaucoma Neg Hx     Macular degeneration Neg Hx     Retinal detachment Neg Hx        Social History     Socioeconomic History    Marital status:      Spouse name: Not on file    Number of children: Not on file    Years of education: Not on file    Highest education level: Not on file   Social Needs    Financial resource strain: Not on file    Food insecurity - worry:  Not on file    Food insecurity - inability: Not on file    Transportation needs - medical: Not on file    Transportation needs - non-medical: Not on file   Occupational History    Not on file   Tobacco Use    Smoking status: Never Smoker    Smokeless tobacco: Never Used   Substance and Sexual Activity    Alcohol use: Yes     Comment: 1 [place] daily    Drug use: No    Sexual activity: Not on file   Other Topics Concern    Not on file   Social History Narrative    Not on file       History of present illness: Patient comes in today for her right ankle. She is status post ORIF of a open ankle fracture. She is generally doing well.      Review of Systems:    Musculoskeletal:  See HPI      Objective:        Physical Examination:    Vital Signs:    Vitals:    01/03/19 1348   BP: 138/70   Pulse: (!) 59       Body mass index is 20.37 kg/m².    This a well-developed, well nourished patient in no acute distress.  They are alert and oriented and cooperative to examination.        Wounds are clean dry and intact. No cellulitis at this time no drainage.  Pertinent New Results:    XRAY Report / Interpretation:   AP lateral and mortise view of the ankle demonstrates her hardware to be in position. Her mortise is well reduced. There is mild displacement of the malleolar fragment    Assessment/Plan:      Stable following ORIF of a right ankle open. Given her age and the significance of this fracture will leave the stitches in for another few days. We will see her back in 5 days for suture removal    This note was created using Dragon voice recognition software that occasionally misinterpreted phrases or words.       [phone]